# Patient Record
Sex: FEMALE | Race: WHITE | NOT HISPANIC OR LATINO | Employment: FULL TIME | URBAN - METROPOLITAN AREA
[De-identification: names, ages, dates, MRNs, and addresses within clinical notes are randomized per-mention and may not be internally consistent; named-entity substitution may affect disease eponyms.]

---

## 2017-04-07 ENCOUNTER — TELEPHONE (OUTPATIENT)
Dept: INTERNAL MEDICINE | Facility: CLINIC | Age: 52
End: 2017-04-07

## 2017-04-07 NOTE — TELEPHONE ENCOUNTER
----- Message from Afshan Barajas sent at 4/7/2017 12:09 PM CDT -----  Contact: Self  Pt would like a call back in regards to scheduling a np appt for a physical and to establish care.     Pt can be contacted at [phone]

## 2017-06-21 ENCOUNTER — PATIENT MESSAGE (OUTPATIENT)
Dept: FAMILY MEDICINE | Facility: CLINIC | Age: 52
End: 2017-06-21

## 2017-07-17 ENCOUNTER — OFFICE VISIT (OUTPATIENT)
Dept: PSYCHIATRY | Facility: CLINIC | Age: 52
End: 2017-07-17
Payer: COMMERCIAL

## 2017-07-17 VITALS
HEIGHT: 66 IN | DIASTOLIC BLOOD PRESSURE: 77 MMHG | WEIGHT: 201 LBS | HEART RATE: 66 BPM | BODY MASS INDEX: 32.3 KG/M2 | SYSTOLIC BLOOD PRESSURE: 135 MMHG

## 2017-07-17 DIAGNOSIS — F41.1 GENERALIZED ANXIETY DISORDER: ICD-10-CM

## 2017-07-17 PROCEDURE — 99999 PR PBB SHADOW E&M-EST. PATIENT-LVL II: CPT | Mod: PBBFAC,,, | Performed by: PSYCHIATRY & NEUROLOGY

## 2017-07-17 PROCEDURE — 99213 OFFICE O/P EST LOW 20 MIN: CPT | Mod: S$GLB,,, | Performed by: PSYCHIATRY & NEUROLOGY

## 2017-07-17 RX ORDER — ALPRAZOLAM 0.5 MG/1
0.5 TABLET ORAL DAILY PRN
Qty: 90 TABLET | Refills: 1 | Status: SHIPPED | OUTPATIENT
Start: 2017-07-17

## 2017-07-17 RX ORDER — DULOXETIN HYDROCHLORIDE 30 MG/1
30 CAPSULE, DELAYED RELEASE ORAL DAILY
Qty: 14 CAPSULE | Refills: 0 | Status: SHIPPED | OUTPATIENT
Start: 2017-07-17 | End: 2017-07-31

## 2017-07-17 RX ORDER — DULOXETIN HYDROCHLORIDE 60 MG/1
60 CAPSULE, DELAYED RELEASE ORAL DAILY
Qty: 30 CAPSULE | Refills: 1 | Status: ON HOLD | OUTPATIENT
Start: 2017-08-01 | End: 2019-01-11 | Stop reason: CLARIF

## 2017-07-17 NOTE — PROGRESS NOTES
"Outpatient Psychiatry Follow-Up Visit (MD/NP)    7/17/2017    Clinical Status of Patient:  Outpatient (Ambulatory)    Chief Complaint:  Lety Vogel is a 51 y.o. female who presents today for follow-up of anxiety.  Met with patient.      Interval History and Content of Current Session:  Interim Events/Subjective Report/Content of Current Session:   Chart reviewed by me.   reviewed, no evidence of Doctor shopping or early refills. Last filled prescription was for Xanax 0.5mg tabs #90 in 10/16.    Patient comes to clinic for anxiety and refill of xanax prescription. She takes 1-2 pills of xanax per week when she feels significant anxiety or panic attack symptoms. Often times, patient has a phyiscal discomfort, "feeling uneasy and tense... I feel like it might be due subconcious stress" in the afternoon, with no obvious trigger. She has been on xanax for 5 years. Her PCP tried her on a different medicine for anxiety years ago, but it was not effective, and she can't remember the name. Her sleep is fine, about 8 hours per night, and no anhedonia, hopelessness, worthlessness or depressed mood. No SI/HI or a/v hallucinations. She denies PTSD and OCD symptoms, and no psychotic symptoms. She has never attempted suicide before and never been in a psychiatric hospital.     Stressors include refinancing her house, and selling her mother's home. Patient's mother is now living with patient's sister. "Mom can be difficult" when describing her personality. Patient lives with her partner of many years, and they plan on getting  sometime this year. Patient doesn't smoke cigarettes, and is a social drinker. No illicit drug use. The patient also suffers from sciatica, and arthritis pain. She doesn't exercise.      Psychotherapy:  · Target symptoms: anxiety   · Why chosen therapy is appropriate versus another modality: relevant to diagnosis, patient responds to this modality, evidence based practice  · Outcome " monitoring methods: self-report  · Therapeutic intervention type: supportive psychotherapy  · Topics discussed/themes: work stress, parenting issues, life stage transitional issues  · The patient's response to the intervention is accepting. The patient's progress toward treatment goals is good.   · Duration of intervention: 15 minutes.    Review of Systems   · PSYCHIATRIC: Pertinant items are noted in the narrative.  · CONSTITUTIONAL: No weight gain or loss.   · MUSCULOSKELETAL: No pain or stiffness of the joints.  · NEUROLOGIC: No weakness, sensory changes, seizures, confusion, memory loss, tremor or other abnormal movements.  · ENDOCRINE: No polydipsia or polyuria.  · INTEGUMENTARY: No rashes or lacerations.  · EYES: No exophthalmos, jaundice or blindness.  · ENT: No dizziness, tinnitus or hearing loss.  · RESPIRATORY: No shortness of breath.  · CARDIOVASCULAR: No tachycardia or chest pain.  · GASTROINTESTINAL: No nausea, vomiting, pain, constipation or diarrhea.  · GENITOURINARY: No frequency, dysuria or sexual dysfunction.  · HEMATOLOGIC/LYMPHATIC: No excessive bleeding, prolonged or excessive bleeding after dental extraction/injury.  · ALLERGIC/IMMUNOLOGIC: No allergic response to materials, foods or animals at this time.    Past Medical, Family and Social History: The patient's past medical, family and social history have been reviewed and updated as appropriate within the electronic medical record - see encounter notes.    Compliance: yes    Side effects: None    Risk Parameters:  Patient reports no suicidal ideation  Patient reports no homicidal ideation  Patient reports no self-injurious behavior  Patient reports no violent behavior    Exam (detailed: at least 9 elements; comprehensive: all 15 elements)   Constitutional  Vitals:  Most recent vital signs, dated greater than 90 days prior to this appointment, were reviewed.   Vitals:    07/17/17 1022   BP: 135/77   Pulse: 66   Weight: 91.2 kg (201 lb)  "  Height: 5' 6" (1.676 m)        General:  unremarkable, age appropriate, obese     Musculoskeletal  Muscle Strength/Tone:  not examined   Gait & Station:  non-ataxic     Psychiatric  Speech:  no latency; no press   Mood & Affect:  happy  congruent and appropriate   Thought Process:  normal and logical   Associations:  intact   Thought Content:  normal, no suicidality, no homicidality, delusions, or paranoia   Insight:  intact   Judgement: behavior is adequate to circumstances   Orientation:  grossly intact   Memory: intact for content of interview   Language: grossly intact   Attention Span & Concentration:  able to focus   Fund of Knowledge:  intact and appropriate to age and level of education     Assessment and Diagnosis   Status/Progress: Based on the examination today, the patient's problem(s) is/are well controlled.  New problems have not been presented today.   Co-morbidities, Diagnostic uncertainty and Lack of compliance are not complicating management of the primary condition.  There are no active rule-out diagnoses for this patient at this time.     General Impression: Generalized Anxiety disorder        Intervention/Counseling/Treatment Plan   · Medication Management: Continue current medications. The risks and benefits of medication were discussed with the patient. continue xanax 0.5mg po prn   · Counseled on the long term effects of benzodiazapine use  · Start Cymbalta with plan to titrate up to 60mg daily in 2 weeks  · Encouraged to begin anxiety behavior therapy workbook      Return to Clinic: 6 weeks  "

## 2017-07-18 ENCOUNTER — OFFICE VISIT (OUTPATIENT)
Dept: INTERNAL MEDICINE | Facility: CLINIC | Age: 52
End: 2017-07-18
Payer: COMMERCIAL

## 2017-07-18 VITALS
SYSTOLIC BLOOD PRESSURE: 130 MMHG | TEMPERATURE: 98 F | HEIGHT: 66 IN | BODY MASS INDEX: 31.82 KG/M2 | HEART RATE: 79 BPM | OXYGEN SATURATION: 99 % | WEIGHT: 198 LBS | DIASTOLIC BLOOD PRESSURE: 70 MMHG

## 2017-07-18 DIAGNOSIS — E28.319 EARLY MENOPAUSE: ICD-10-CM

## 2017-07-18 DIAGNOSIS — Z91.89 SEDENTARY LIFESTYLE: ICD-10-CM

## 2017-07-18 DIAGNOSIS — Z00.00 ANNUAL PHYSICAL EXAM: Primary | ICD-10-CM

## 2017-07-18 DIAGNOSIS — Z83.3 FAMILY HISTORY OF DIABETES MELLITUS: ICD-10-CM

## 2017-07-18 PROCEDURE — 99999 PR PBB SHADOW E&M-EST. PATIENT-LVL IV: CPT | Mod: PBBFAC,,, | Performed by: INTERNAL MEDICINE

## 2017-07-18 PROCEDURE — 99386 PREV VISIT NEW AGE 40-64: CPT | Mod: S$GLB,,, | Performed by: INTERNAL MEDICINE

## 2017-07-18 RX ORDER — PHENTERMINE HYDROCHLORIDE 37.5 MG/1
37.5 CAPSULE ORAL EVERY MORNING
Status: ON HOLD | COMMUNITY
End: 2019-01-11 | Stop reason: CLARIF

## 2017-07-19 NOTE — PROGRESS NOTES
Subjective:       Patient ID: Lety Vogel is a 51 y.o. female.    Chief Complaint: Annual Exam  wellness  Entire chart reviewed, PMx, FHx, and Social History updated and reviewed.    HPI  Review of Systems   Constitutional: Negative for activity change.   Eyes: Negative for discharge.   Respiratory: Negative for wheezing.    Cardiovascular: Positive for palpitations. Negative for chest pain.   Gastrointestinal: Negative for constipation, diarrhea and vomiting.   Genitourinary: Negative for difficulty urinating and hematuria.   Musculoskeletal: Positive for arthralgias.   Neurological: Negative for headaches.   Psychiatric/Behavioral: Negative for dysphoric mood.       Objective:      Physical Exam   Constitutional: She is oriented to person, place, and time. She appears well-developed and well-nourished. No distress.   HENT:   Head: Normocephalic and atraumatic.   Right Ear: External ear normal.   Left Ear: External ear normal.   Nose: Nose normal.   Mouth/Throat: Oropharynx is clear and moist. No oropharyngeal exudate.   Eyes: Conjunctivae and EOM are normal. Pupils are equal, round, and reactive to light. Right eye exhibits no discharge. No scleral icterus.   Neck: Normal range of motion and full passive range of motion without pain. Neck supple. No spinous process tenderness and no muscular tenderness present. Carotid bruit is not present. No thyromegaly present.   Cardiovascular: Normal rate, regular rhythm, S1 normal, S2 normal, normal heart sounds and intact distal pulses.  Exam reveals no gallop and no friction rub.    No murmur heard.  Pulmonary/Chest: Effort normal and breath sounds normal. No respiratory distress. She has no wheezes. She has no rales. She exhibits no tenderness.   Abdominal: Soft. Bowel sounds are normal. She exhibits no distension and no mass. There is no tenderness. There is no rebound and no guarding.   Genitourinary: Pelvic exam was performed with patient supine. Uterus is not  deviated, not enlarged, not fixed and not tender. Cervix exhibits no motion tenderness, no discharge and no friability. Right adnexum displays no mass, no tenderness and no fullness. Left adnexum displays no mass, no tenderness and no fullness.   Musculoskeletal: Normal range of motion. She exhibits no edema or tenderness.   Lymphadenopathy:        Head (right side): No submental and no submandibular adenopathy present.        Head (left side): No submental and no submandibular adenopathy present.     She has no cervical adenopathy.        Right cervical: No superficial cervical, no deep cervical and no posterior cervical adenopathy present.       Left cervical: No superficial cervical, no deep cervical and no posterior cervical adenopathy present.        Right axillary: No pectoral and no lateral adenopathy present.        Left axillary: No pectoral and no lateral adenopathy present.       Right: No supraclavicular adenopathy present.        Left: No supraclavicular adenopathy present.   Neurological: She is alert and oriented to person, place, and time. She has normal reflexes. No cranial nerve deficit. She exhibits normal muscle tone. Coordination normal.   Skin: Skin is warm and dry. No rash noted.   Psychiatric: She has a normal mood and affect. Her behavior is normal. Her mood appears not anxious. Her speech is not rapid and/or pressured. She is not agitated. She does not exhibit a depressed mood.   Normal behavior, thought content, insight and judgement.       Assessment:       1. Annual physical exam    2. Early menopause    3. Family history of diabetes mellitus    4. Sedentary lifestyle        Plan:   Lety was seen today for annual exam.    Diagnoses and all orders for this visit:    Annual physical exam  The only thing that concerns me is her sedentary lifestyle  She has a brother who has obesity and diabetes.  He lives in [place] and is disabled.  Her father  at the age of 64.  Her mother is alive  and well at 84.  -     Lipid panel; Future  -     Hemoglobin A1c; Future    Early menopause  -     DXA Bone Density Spine And Hip; Future    Family history of diabetes mellitus  -     Hemoglobin A1c; Future    Sedentary lifestyle  Maybe she will take an interest in fencing again.  She used to enjoy this in the past.  She is a very john woman    Return in about 1 year (around 7/18/2018) for for PE.\

## 2017-07-25 ENCOUNTER — LAB VISIT (OUTPATIENT)
Dept: LAB | Facility: HOSPITAL | Age: 52
End: 2017-07-25
Attending: INTERNAL MEDICINE
Payer: COMMERCIAL

## 2017-07-25 DIAGNOSIS — Z00.00 ANNUAL PHYSICAL EXAM: ICD-10-CM

## 2017-07-25 DIAGNOSIS — Z83.3 FAMILY HISTORY OF DIABETES MELLITUS: ICD-10-CM

## 2017-07-25 LAB
CHOLEST/HDLC SERPL: 2.8 {RATIO}
HDL/CHOLESTEROL RATIO: 35.4 %
HDLC SERPL-MCNC: 181 MG/DL
HDLC SERPL-MCNC: 64 MG/DL
LDLC SERPL CALC-MCNC: 98.6 MG/DL
NONHDLC SERPL-MCNC: 117 MG/DL
TRIGL SERPL-MCNC: 92 MG/DL

## 2017-07-25 PROCEDURE — 83036 HEMOGLOBIN GLYCOSYLATED A1C: CPT

## 2017-07-25 PROCEDURE — 36415 COLL VENOUS BLD VENIPUNCTURE: CPT

## 2017-07-25 PROCEDURE — 80061 LIPID PANEL: CPT

## 2017-07-26 LAB
ESTIMATED AVG GLUCOSE: 108 MG/DL
HBA1C MFR BLD HPLC: 5.4 %

## 2017-07-28 ENCOUNTER — HOSPITAL ENCOUNTER (OUTPATIENT)
Dept: RADIOLOGY | Facility: CLINIC | Age: 52
Discharge: HOME | End: 2017-07-28
Attending: INTERNAL MEDICINE
Payer: COMMERCIAL

## 2017-07-28 DIAGNOSIS — E28.319 EARLY MENOPAUSE: ICD-10-CM

## 2017-07-28 PROCEDURE — 77080 DXA BONE DENSITY AXIAL: CPT | Mod: TC

## 2017-07-28 PROCEDURE — 77080 DXA BONE DENSITY AXIAL: CPT | Mod: 26,,, | Performed by: INTERNAL MEDICINE

## 2017-08-09 RX ORDER — ESTRADIOL 2 MG/1
2 TABLET ORAL DAILY
Qty: 30 TABLET | Refills: 11 | Status: SHIPPED | OUTPATIENT
Start: 2017-08-09 | End: 2018-09-10 | Stop reason: SDUPTHER

## 2017-09-07 ENCOUNTER — PATIENT MESSAGE (OUTPATIENT)
Dept: INTERNAL MEDICINE | Facility: CLINIC | Age: 52
End: 2017-09-07

## 2017-09-07 DIAGNOSIS — L81.9 PIGMENTED SKIN LESION SUSPICIOUS FOR MALIGNANT NEOPLASM: Primary | ICD-10-CM

## 2017-12-07 ENCOUNTER — PATIENT MESSAGE (OUTPATIENT)
Dept: OBSTETRICS AND GYNECOLOGY | Facility: CLINIC | Age: 52
End: 2017-12-07

## 2017-12-07 DIAGNOSIS — Z12.31 VISIT FOR SCREENING MAMMOGRAM: Primary | ICD-10-CM

## 2017-12-20 ENCOUNTER — HOSPITAL ENCOUNTER (OUTPATIENT)
Dept: RADIOLOGY | Facility: HOSPITAL | Age: 52
Discharge: HOME | End: 2017-12-20
Attending: OBSTETRICS & GYNECOLOGY
Payer: COMMERCIAL

## 2017-12-20 VITALS — WEIGHT: 198 LBS | BODY MASS INDEX: 31.82 KG/M2 | HEIGHT: 66 IN

## 2017-12-20 DIAGNOSIS — Z12.31 VISIT FOR SCREENING MAMMOGRAM: ICD-10-CM

## 2017-12-20 PROCEDURE — 77067 SCR MAMMO BI INCL CAD: CPT | Mod: TC

## 2017-12-20 PROCEDURE — 77063 BREAST TOMOSYNTHESIS BI: CPT | Mod: 26,,, | Performed by: RADIOLOGY

## 2017-12-20 PROCEDURE — 77067 SCR MAMMO BI INCL CAD: CPT | Mod: 26,,, | Performed by: RADIOLOGY

## 2017-12-28 ENCOUNTER — HOSPITAL ENCOUNTER (OUTPATIENT)
Dept: RADIOLOGY | Facility: HOSPITAL | Age: 52
Discharge: HOME | End: 2017-12-28
Attending: OBSTETRICS & GYNECOLOGY
Payer: COMMERCIAL

## 2017-12-28 DIAGNOSIS — R92.8 ABNORMAL MAMMOGRAM: ICD-10-CM

## 2017-12-28 PROCEDURE — 77061 BREAST TOMOSYNTHESIS UNI: CPT | Mod: TC,PO

## 2017-12-28 PROCEDURE — 77065 DX MAMMO INCL CAD UNI: CPT | Mod: 26,,, | Performed by: RADIOLOGY

## 2017-12-28 PROCEDURE — 76642 ULTRASOUND BREAST LIMITED: CPT | Mod: 26,RT,, | Performed by: RADIOLOGY

## 2017-12-28 PROCEDURE — 77061 BREAST TOMOSYNTHESIS UNI: CPT | Mod: 26,,, | Performed by: RADIOLOGY

## 2018-01-02 ENCOUNTER — PATIENT MESSAGE (OUTPATIENT)
Dept: OBSTETRICS AND GYNECOLOGY | Facility: CLINIC | Age: 53
End: 2018-01-02

## 2018-09-07 RX ORDER — ESTRADIOL 2 MG/1
TABLET ORAL
Qty: 30 TABLET | Refills: 0 | Status: CANCELLED | OUTPATIENT
Start: 2018-09-07

## 2018-09-10 ENCOUNTER — PATIENT MESSAGE (OUTPATIENT)
Dept: OBSTETRICS AND GYNECOLOGY | Facility: CLINIC | Age: 53
End: 2018-09-10

## 2018-09-10 RX ORDER — ESTRADIOL 2 MG/1
2 TABLET ORAL DAILY
Qty: 30 TABLET | Refills: 11 | Status: SHIPPED | OUTPATIENT
Start: 2018-09-10 | End: 2020-03-18

## 2018-10-31 ENCOUNTER — PATIENT MESSAGE (OUTPATIENT)
Dept: INTERNAL MEDICINE | Facility: CLINIC | Age: 53
End: 2018-10-31

## 2018-10-31 ENCOUNTER — TELEPHONE (OUTPATIENT)
Dept: INTERNAL MEDICINE | Facility: CLINIC | Age: 53
End: 2018-10-31

## 2018-10-31 NOTE — TELEPHONE ENCOUNTER
Hello. I'm due for my colonoscopy. Do I need a referral or an order placed before I can schedule? I'd like to get the same physician and all as last time. I looked through my records and it was a Dr. Hall.   Thanks,   Lety           I pend the Colonoscopy orders  Please advise  Thank you

## 2018-11-01 NOTE — TELEPHONE ENCOUNTER
Dear Analy,  Please schedule an appointment for this patient.  I'm not comfortable ordering a colonoscopy because it is an invasive screening procedure with potential risks simply  because I have NOT seen her since July of 2017. I remember her as a john, intelligent  woman ( I have only seen her ONCE)  and I hope she is well and just needs a screening colonoscopy   it is great  that she sees the value of screening colonoscopy and wants this. Yet I don't want to order a procedure unless I know the patient understands the risks and benefits,  I hope you see my pont of view.  Sincerely, Dr. Maya Alvarado

## 2018-11-28 ENCOUNTER — TELEPHONE (OUTPATIENT)
Dept: INTERNAL MEDICINE | Facility: CLINIC | Age: 53
End: 2018-11-28

## 2018-11-28 NOTE — TELEPHONE ENCOUNTER
----- Message from Giovanni Bryant sent at 11/28/2018  3:54 PM CST -----  Contact: Patient  Patient wants to make an appointment for Friday for a chickenpox vaccine    Callback:[phone]

## 2018-11-28 NOTE — TELEPHONE ENCOUNTER
Spoke with patient and informed her that she can go to the pharmacy for any of her vaccines she may need

## 2019-01-04 ENCOUNTER — HOSPITAL ENCOUNTER (EMERGENCY)
Facility: HOSPITAL | Age: 54
Discharge: HOME | End: 2019-01-04
Attending: EMERGENCY MEDICINE
Payer: COMMERCIAL

## 2019-01-04 ENCOUNTER — TELEPHONE (OUTPATIENT)
Dept: OBSTETRICS AND GYNECOLOGY | Facility: CLINIC | Age: 54
End: 2019-01-04

## 2019-01-04 VITALS
DIASTOLIC BLOOD PRESSURE: 84 MMHG | HEIGHT: 66 IN | OXYGEN SATURATION: 100 % | RESPIRATION RATE: 20 BRPM | WEIGHT: 200 LBS | SYSTOLIC BLOOD PRESSURE: 162 MMHG | TEMPERATURE: 98 F | BODY MASS INDEX: 32.14 KG/M2 | HEART RATE: 70 BPM

## 2019-01-04 DIAGNOSIS — M25.532 WRIST PAIN, ACUTE, LEFT: ICD-10-CM

## 2019-01-04 DIAGNOSIS — S52.602A CLOSED FRACTURE OF DISTAL ENDS OF LEFT RADIUS AND ULNA, INITIAL ENCOUNTER: Primary | ICD-10-CM
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
## 2019-01-04 DIAGNOSIS — S52.502A CLOSED FRACTURE OF DISTAL ENDS OF LEFT RADIUS AND ULNA, INITIAL ENCOUNTER: Primary | ICD-10-CM

## 2019-01-04 PROCEDURE — 99284 PR EMERGENCY DEPT VISIT,LEVEL IV: ICD-10-PCS | Mod: ,,, | Performed by: EMERGENCY MEDICINE

## 2019-01-04 PROCEDURE — 63600175 PHARM REV CODE 636 W HCPCS: Performed by: EMERGENCY MEDICINE

## 2019-01-04 PROCEDURE — 99285 EMERGENCY DEPT VISIT HI MDM: CPT | Mod: 25

## 2019-01-04 PROCEDURE — 99284 EMERGENCY DEPT VISIT MOD MDM: CPT | Mod: ,,, | Performed by: EMERGENCY MEDICINE

## 2019-01-04 PROCEDURE — 25000003 PHARM REV CODE 250: Performed by: PHYSICIAN ASSISTANT

## 2019-01-04 PROCEDURE — 25000003 PHARM REV CODE 250: Performed by: STUDENT IN AN ORGANIZED HEALTH CARE EDUCATION/TRAINING PROGRAM

## 2019-01-04 PROCEDURE — 25605 CLTX DST RDL FX/EPHYS SEP W/: CPT | Mod: LT

## 2019-01-04 PROCEDURE — 96372 THER/PROPH/DIAG INJ SC/IM: CPT

## 2019-01-04 RX ORDER — HYDROCODONE BITARTRATE AND ACETAMINOPHEN 5; 325 MG/1; MG/1
1 TABLET ORAL EVERY 4 HOURS PRN
Qty: 18 TABLET | Refills: 0 | Status: ON HOLD | OUTPATIENT
Start: 2019-01-04 | End: 2019-01-11 | Stop reason: HOSPADM

## 2019-01-04 RX ORDER — ONDANSETRON 4 MG/1
4 TABLET, FILM COATED ORAL EVERY 6 HOURS
Qty: 15 TABLET | Refills: 0 | Status: SHIPPED | OUTPATIENT
Start: 2019-01-04 | End: 2021-07-29 | Stop reason: ALTCHOICE

## 2019-01-04 RX ORDER — MORPHINE SULFATE 2 MG/ML
6 INJECTION, SOLUTION INTRAMUSCULAR; INTRAVENOUS
Status: COMPLETED | OUTPATIENT
Start: 2019-01-04 | End: 2019-01-04

## 2019-01-04 RX ORDER — HYDROCODONE BITARTRATE AND ACETAMINOPHEN 10; 325 MG/1; MG/1
1 TABLET ORAL
Status: COMPLETED | OUTPATIENT
Start: 2019-01-04 | End: 2019-01-04

## 2019-01-04 RX ORDER — LIDOCAINE HYDROCHLORIDE 10 MG/ML
10 INJECTION, SOLUTION EPIDURAL; INFILTRATION; INTRACAUDAL; PERINEURAL ONCE
Status: COMPLETED | OUTPATIENT
Start: 2019-01-04 | End: 2019-01-04

## 2019-01-04 RX ORDER — DIAZEPAM 5 MG/1
5 TABLET ORAL ONCE
Status: COMPLETED | OUTPATIENT
Start: 2019-01-04 | End: 2019-01-04

## 2019-01-04 RX ORDER — ONDANSETRON 4 MG/1
4 TABLET, ORALLY DISINTEGRATING ORAL
Status: COMPLETED | OUTPATIENT
Start: 2019-01-04 | End: 2019-01-04

## 2019-01-04 RX ADMIN — Medication 6 MG: at 05:01

## 2019-01-04 RX ADMIN — LIDOCAINE HYDROCHLORIDE 100 MG: 10 INJECTION, SOLUTION EPIDURAL; INFILTRATION; INTRACAUDAL at 07:01

## 2019-01-04 RX ADMIN — HYDROCODONE BITARTRATE AND ACETAMINOPHEN 1 TABLET: 10; 325 TABLET ORAL at 09:01

## 2019-01-04 RX ADMIN — DIAZEPAM 5 MG: 5 TABLET ORAL at 06:01

## 2019-01-04 RX ADMIN — ONDANSETRON 4 MG: 4 TABLET, ORALLY DISINTEGRATING ORAL at 06:01

## 2019-01-04 NOTE — ED TRIAGE NOTES
"Patient reports "stepping" back off ladder and falling into table. Patient reports she struck her left wrist on the leg of the table. Patient presents with left wrist pain and minor deformity noted. +PMS noted to left hand and wrist. +2 radial pulse present. Patient A&Ox4 and following commands.   "
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
## 2019-01-04 NOTE — TELEPHONE ENCOUNTER
----- Message from Merary Domingo sent at 1/4/2019  9:01 AM CST -----  Contact: Pt   Name of Who is Calling: PHILIPP [facility]IVE [[phone]]    What is the request in detail: Pt is requesting an appt for her annual and she prefer a Friday if possible. Please advise.     Can the clinic reply by [facility]: No     What Number to Call Back if not in [facility]: [phone]

## 2019-01-05 NOTE — ASSESSMENT & PLAN NOTE
Lety Vogel is a 53 y.o. female with a left DRFx  - Reduced and splinted in ED under hematoma block  - NWB to [place], pt encouraged to keep extremity iced and elevated at all times  - pain control per ED   -Recommend 500mg Vitamin C daily x 50 days for CRPS prophylaxis  - discussed with pt recc for operative fixation of this fracture   - F/u in hand clinic next week to discuss surgery

## 2019-01-05 NOTE — DISCHARGE INSTRUCTIONS
Please follow-up with our Orthopedic clinic as directed for evaluation of your fracture.  Please take Tylenol for ongoing pain management. You may use the prescribed Norco for breakthrough pain as needed.   Please return to the ED for new, worsening, or concerning symptoms.    Our goal in the emergency department is to always give you outstanding care and exceptional service. You may receive a survey by mail or e-mail in the next week regarding your experience in our ED. We would greatly appreciate your completing and returning the survey. Your feedback provides us with a way to recognize our staff who give very good care and it helps us learn how to improve when your experience was below our aspiration of excellence.
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
## 2019-01-05 NOTE — ED NOTES
Report received from Rudi Antonio RN. Care assumed. Pt resting comfortably and independently repositioned in stretcher with bed locked in lowest position for safety. NAD and patient states she feels a little better. Respirations even and unlabored and visible chest rise noted. Patient offered bathroom assistance and denies need at this time. Pt instructed to call if assistance is needed.. No needs at this time. Will continue to monitor. Call light within reach. Family at bedside. Orthopedic surgery at bedside

## 2019-01-05 NOTE — HPI
Lety Vogel is a RHD 53 y.o. female who presents to the ED with c/o left wrist pain after a fall this afternoon. She missed the last step as she was coming down a small ladder and fell back hitting her left wrist on a table. Severe wrist pain and deformity. Denies numbness and tingling to LUE.  Denies any other musculoskeletal pain or injuries. No history of fractures. She is not on anticoagulation.

## 2019-01-07 ENCOUNTER — TELEPHONE (OUTPATIENT)
Dept: ORTHOPEDICS | Facility: CLINIC | Age: 54
End: 2019-01-07

## 2019-01-07 NOTE — TELEPHONE ENCOUNTER
----- Message from Flora Sandoval MD sent at 1/4/2019  8:13 PM CST -----  Please schedule for fclinic early next week, patient will need surgery hiopefully next week for left DRFx thanks

## 2019-01-08 ENCOUNTER — DOCUMENTATION ONLY (OUTPATIENT)
Dept: ORTHOPEDICS | Facility: CLINIC | Age: 54
End: 2019-01-08

## 2019-01-08 ENCOUNTER — OFFICE VISIT (OUTPATIENT)
Dept: ORTHOPEDICS | Facility: CLINIC | Age: 54
End: 2019-01-08
Payer: COMMERCIAL

## 2019-01-08 VITALS
HEART RATE: 93 BPM | SYSTOLIC BLOOD PRESSURE: 124 MMHG | WEIGHT: 200 LBS | HEIGHT: 66 IN | BODY MASS INDEX: 32.14 KG/M2 | DIASTOLIC BLOOD PRESSURE: 84 MMHG

## 2019-01-08 DIAGNOSIS — S52.602A CLOSED FRACTURE OF DISTAL END OF LEFT ULNA, UNSPECIFIED FRACTURE MORPHOLOGY, INITIAL ENCOUNTER: ICD-10-CM

## 2019-01-08 DIAGNOSIS — S52.592A OTHER CLOSED FRACTURE OF DISTAL END OF LEFT RADIUS, INITIAL ENCOUNTER: Primary | ICD-10-CM

## 2019-01-08 DIAGNOSIS — S52.502A CLOSED FRACTURE OF DISTAL END OF LEFT RADIUS, UNSPECIFIED FRACTURE MORPHOLOGY, INITIAL ENCOUNTER: Primary | ICD-10-CM

## 2019-01-08 PROCEDURE — 99204 OFFICE O/P NEW MOD 45 MIN: CPT | Mod: 25,S$GLB,, | Performed by: PHYSICIAN ASSISTANT

## 2019-01-08 PROCEDURE — 3008F PR BODY MASS INDEX (BMI) DOCUMENTED: ICD-10-PCS | Mod: CPTII,S$GLB,, | Performed by: PHYSICIAN ASSISTANT

## 2019-01-08 PROCEDURE — 3008F BODY MASS INDEX DOCD: CPT | Mod: CPTII,S$GLB,, | Performed by: PHYSICIAN ASSISTANT

## 2019-01-08 PROCEDURE — 29105 PR APPLY LONG ARM SPLINT: ICD-10-PCS | Mod: LT,S$GLB,, | Performed by: PHYSICIAN ASSISTANT

## 2019-01-08 PROCEDURE — 99204 PR OFFICE/OUTPT VISIT, NEW, LEVL IV, 45-59 MIN: ICD-10-PCS | Mod: 25,S$GLB,, | Performed by: PHYSICIAN ASSISTANT

## 2019-01-08 PROCEDURE — 99999 PR PBB SHADOW E&M-EST. PATIENT-LVL III: CPT | Mod: PBBFAC,,, | Performed by: PHYSICIAN ASSISTANT

## 2019-01-08 PROCEDURE — 29105 APPLICATION LONG ARM SPLINT: CPT | Mod: LT,S$GLB,, | Performed by: PHYSICIAN ASSISTANT

## 2019-01-08 PROCEDURE — 99999 PR PBB SHADOW E&M-EST. PATIENT-LVL III: ICD-10-PCS | Mod: PBBFAC,,, | Performed by: PHYSICIAN ASSISTANT

## 2019-01-08 RX ORDER — MUPIROCIN 20 MG/G
OINTMENT TOPICAL
Status: CANCELLED | OUTPATIENT
Start: 2019-01-08
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
## 2019-01-10 ENCOUNTER — TELEPHONE (OUTPATIENT)
Dept: ORTHOPEDICS | Facility: CLINIC | Age: 54
End: 2019-01-10

## 2019-01-10 NOTE — TELEPHONE ENCOUNTER
Lety Vogel notified of arrival time 0945 for surgery on 1/11/19 with Dr. EBONY Dai. At [facility]. Post Op appointment made, slip in mail. Reminded of need for a ride home from surgery.
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
## 2019-01-11 ENCOUNTER — HOSPITAL ENCOUNTER (OUTPATIENT)
Facility: HOSPITAL | Age: 54
Discharge: HOME | End: 2019-01-11
Attending: ORTHOPAEDIC SURGERY | Admitting: ORTHOPAEDIC SURGERY
Payer: COMMERCIAL

## 2019-01-11 ENCOUNTER — ANESTHESIA (OUTPATIENT)
Dept: SURGERY | Facility: HOSPITAL | Age: 54
End: 2019-01-11
Payer: COMMERCIAL

## 2019-01-11 ENCOUNTER — ANESTHESIA EVENT (OUTPATIENT)
Dept: SURGERY | Facility: HOSPITAL | Age: 54
End: 2019-01-11
Payer: COMMERCIAL

## 2019-01-11 VITALS
SYSTOLIC BLOOD PRESSURE: 127 MMHG | HEIGHT: 66 IN | TEMPERATURE: 97 F | WEIGHT: 200 LBS | RESPIRATION RATE: 20 BRPM | DIASTOLIC BLOOD PRESSURE: 86 MMHG | OXYGEN SATURATION: 100 % | BODY MASS INDEX: 32.14 KG/M2 | HEART RATE: 92 BPM

## 2019-01-11 DIAGNOSIS — S52.592A OTHER CLOSED FRACTURE OF DISTAL END OF LEFT RADIUS, INITIAL ENCOUNTER: ICD-10-CM

## 2019-01-11 DIAGNOSIS — S52.502D CLOSED FRACTURE OF DISTAL END OF LEFT RADIUS WITH ROUTINE HEALING, UNSPECIFIED FRACTURE MORPHOLOGY, SUBSEQUENT ENCOUNTER: Primary | ICD-10-CM

## 2019-01-11 PROCEDURE — 76942 LEFT SUPRACLAVICULAR CATHETER: ICD-10-PCS | Mod: 26,,, | Performed by: ANESTHESIOLOGY

## 2019-01-11 PROCEDURE — 64416 NJX AA&/STRD BRCH PL NFS IMG: CPT | Performed by: ANESTHESIOLOGY

## 2019-01-11 PROCEDURE — 25000003 PHARM REV CODE 250: Performed by: ORTHOPAEDIC SURGERY

## 2019-01-11 PROCEDURE — 63600175 PHARM REV CODE 636 W HCPCS: Performed by: PHYSICIAN ASSISTANT

## 2019-01-11 PROCEDURE — 63600175 PHARM REV CODE 636 W HCPCS: Performed by: NURSE ANESTHETIST, CERTIFIED REGISTERED

## 2019-01-11 PROCEDURE — 71000015 HC POSTOP RECOV 1ST HR: Performed by: ORTHOPAEDIC SURGERY

## 2019-01-11 PROCEDURE — D9220A PRA ANESTHESIA: Mod: CRNA,,, | Performed by: NURSE ANESTHETIST, CERTIFIED REGISTERED

## 2019-01-11 PROCEDURE — C1713 ANCHOR/SCREW BN/BN,TIS/BN: HCPCS | Performed by: ORTHOPAEDIC SURGERY

## 2019-01-11 PROCEDURE — 36000711: Performed by: ORTHOPAEDIC SURGERY

## 2019-01-11 PROCEDURE — 37000008 HC ANESTHESIA 1ST 15 MINUTES: Performed by: ORTHOPAEDIC SURGERY

## 2019-01-11 PROCEDURE — 25609 OPTX DST RD XART FX/EP SEP3+: CPT | Mod: LT,,, | Performed by: ORTHOPAEDIC SURGERY

## 2019-01-11 PROCEDURE — 63600175 PHARM REV CODE 636 W HCPCS: Performed by: ANESTHESIOLOGY

## 2019-01-11 PROCEDURE — 25000003 PHARM REV CODE 250: Performed by: PHYSICIAN ASSISTANT

## 2019-01-11 PROCEDURE — 25609 PR OPEN RX DISTAL RADIUS FX, INTRA-ARTICULAR, 3+ FRAG: ICD-10-PCS | Mod: LT,,, | Performed by: ORTHOPAEDIC SURGERY

## 2019-01-11 PROCEDURE — 37000009 HC ANESTHESIA EA ADD 15 MINS: Performed by: ORTHOPAEDIC SURGERY

## 2019-01-11 PROCEDURE — D9220A PRA ANESTHESIA: ICD-10-PCS | Mod: ANES,,, | Performed by: ANESTHESIOLOGY

## 2019-01-11 PROCEDURE — 36000710: Performed by: ORTHOPAEDIC SURGERY

## 2019-01-11 PROCEDURE — C1769 GUIDE WIRE: HCPCS | Performed by: ORTHOPAEDIC SURGERY

## 2019-01-11 PROCEDURE — 76942 ECHO GUIDE FOR BIOPSY: CPT | Mod: 26,,, | Performed by: ANESTHESIOLOGY

## 2019-01-11 PROCEDURE — 25000003 PHARM REV CODE 250: Performed by: NURSE ANESTHETIST, CERTIFIED REGISTERED

## 2019-01-11 PROCEDURE — 64416 LEFT SUPRACLAVICULAR CATHETER: ICD-10-PCS | Mod: 59,LT,, | Performed by: ANESTHESIOLOGY

## 2019-01-11 PROCEDURE — D9220A PRA ANESTHESIA: ICD-10-PCS | Mod: CRNA,,, | Performed by: NURSE ANESTHETIST, CERTIFIED REGISTERED

## 2019-01-11 PROCEDURE — 27201423 OPTIME MED/SURG SUP & DEVICES STERILE SUPPLY: Performed by: ORTHOPAEDIC SURGERY

## 2019-01-11 PROCEDURE — D9220A PRA ANESTHESIA: Mod: ANES,,, | Performed by: ANESTHESIOLOGY

## 2019-01-11 PROCEDURE — 63600175 PHARM REV CODE 636 W HCPCS: Performed by: STUDENT IN AN ORGANIZED HEALTH CARE EDUCATION/TRAINING PROGRAM

## 2019-01-11 DEVICE — PLATE ACU-LOC 2 VDF LT NARROW: Type: IMPLANTABLE DEVICE | Site: RADIUS | Status: FUNCTIONAL

## 2019-01-11 DEVICE — SCREW BONE LOK CONG 2.3X20MM: Type: IMPLANTABLE DEVICE | Site: RADIUS | Status: FUNCTIONAL

## 2019-01-11 DEVICE — SCREW BNE N LOK HEXLB 3.5X12: Type: IMPLANTABLE DEVICE | Site: RADIUS | Status: FUNCTIONAL

## 2019-01-11 DEVICE — SCREW BONE LOK CONG 2.3X22MM: Type: IMPLANTABLE DEVICE | Site: RADIUS | Status: FUNCTIONAL

## 2019-01-11 DEVICE — SCREW BONE 2.3 X 18MM: Type: IMPLANTABLE DEVICE | Site: RADIUS | Status: FUNCTIONAL

## 2019-01-11 DEVICE — SCREW BONE NONLOCK HEX 3.5X10: Type: IMPLANTABLE DEVICE | Site: RADIUS | Status: FUNCTIONAL

## 2019-01-11 RX ORDER — BACITRACIN 500 [USP'U]/G
OINTMENT TOPICAL
Status: DISCONTINUED
Start: 2019-01-11 | End: 2019-01-11 | Stop reason: HOSPADM

## 2019-01-11 RX ORDER — FENTANYL CITRATE 50 UG/ML
25 INJECTION, SOLUTION INTRAMUSCULAR; INTRAVENOUS EVERY 5 MIN PRN
Status: DISCONTINUED | OUTPATIENT
Start: 2019-01-11 | End: 2019-01-11 | Stop reason: HOSPADM

## 2019-01-11 RX ORDER — BACITRACIN ZINC 500 UNIT/G
OINTMENT (GRAM) TOPICAL
Status: DISCONTINUED | OUTPATIENT
Start: 2019-01-11 | End: 2019-01-11 | Stop reason: HOSPADM

## 2019-01-11 RX ORDER — ROPIVACAINE HYDROCHLORIDE 5 MG/ML
INJECTION, SOLUTION EPIDURAL; INFILTRATION; PERINEURAL
Status: COMPLETED | OUTPATIENT
Start: 2019-01-11 | End: 2019-01-11

## 2019-01-11 RX ORDER — SODIUM CHLORIDE 9 MG/ML
INJECTION, SOLUTION INTRAVENOUS CONTINUOUS PRN
Status: DISCONTINUED | OUTPATIENT
Start: 2019-01-11 | End: 2019-01-11

## 2019-01-11 RX ORDER — LIDOCAINE HCL/PF 100 MG/5ML
SYRINGE (ML) INTRAVENOUS
Status: DISCONTINUED | OUTPATIENT
Start: 2019-01-11 | End: 2019-01-11

## 2019-01-11 RX ORDER — FENTANYL CITRATE 50 UG/ML
25 INJECTION, SOLUTION INTRAMUSCULAR; INTRAVENOUS EVERY 5 MIN PRN
Status: DISCONTINUED | OUTPATIENT
Start: 2019-01-11 | End: 2019-01-11 | Stop reason: SDUPTHER

## 2019-01-11 RX ORDER — OXYCODONE HYDROCHLORIDE 5 MG/1
10 TABLET ORAL EVERY 4 HOURS PRN
Status: DISCONTINUED | OUTPATIENT
Start: 2019-01-11 | End: 2019-01-11 | Stop reason: HOSPADM

## 2019-01-11 RX ORDER — MIDAZOLAM HYDROCHLORIDE 1 MG/ML
0.5 INJECTION INTRAMUSCULAR; INTRAVENOUS
Status: DISCONTINUED | OUTPATIENT
Start: 2019-01-11 | End: 2019-01-11 | Stop reason: SDUPTHER

## 2019-01-11 RX ORDER — ASCORBIC ACID 500 MG
500 TABLET ORAL DAILY
Qty: 50 TABLET | Refills: 0 | Status: SHIPPED | OUTPATIENT
Start: 2019-01-11

## 2019-01-11 RX ORDER — SODIUM CHLORIDE 0.9 % (FLUSH) 0.9 %
3 SYRINGE (ML) INJECTION
Status: DISCONTINUED | OUTPATIENT
Start: 2019-01-11 | End: 2019-01-11 | Stop reason: HOSPADM

## 2019-01-11 RX ORDER — PROPOFOL 10 MG/ML
INJECTION, EMULSION INTRAVENOUS CONTINUOUS PRN
Status: DISCONTINUED | OUTPATIENT
Start: 2019-01-11 | End: 2019-01-11

## 2019-01-11 RX ORDER — DEXAMETHASONE SODIUM PHOSPHATE 4 MG/ML
INJECTION, SOLUTION INTRA-ARTICULAR; INTRALESIONAL; INTRAMUSCULAR; INTRAVENOUS; SOFT TISSUE
Status: DISCONTINUED | OUTPATIENT
Start: 2019-01-11 | End: 2019-01-11

## 2019-01-11 RX ORDER — MUPIROCIN 20 MG/G
OINTMENT TOPICAL
Status: DISCONTINUED | OUTPATIENT
Start: 2019-01-11 | End: 2019-01-11 | Stop reason: HOSPADM

## 2019-01-11 RX ORDER — OXYCODONE AND ACETAMINOPHEN 5; 325 MG/1; MG/1
1 TABLET ORAL EVERY 6 HOURS PRN
Qty: 35 TABLET | Refills: 0 | Status: SHIPPED | OUTPATIENT
Start: 2019-01-11 | End: 2021-07-29 | Stop reason: ALTCHOICE

## 2019-01-11 RX ORDER — PROPOFOL 10 MG/ML
INJECTION, EMULSION INTRAVENOUS
Status: DISCONTINUED | OUTPATIENT
Start: 2019-01-11 | End: 2019-01-11

## 2019-01-11 RX ORDER — CEFAZOLIN SODIUM 1 G/3ML
2 INJECTION, POWDER, FOR SOLUTION INTRAMUSCULAR; INTRAVENOUS
Status: COMPLETED | OUTPATIENT
Start: 2019-01-11 | End: 2019-01-11

## 2019-01-11 RX ORDER — OXYCODONE HYDROCHLORIDE 5 MG/1
5 TABLET ORAL EVERY 4 HOURS PRN
Status: DISCONTINUED | OUTPATIENT
Start: 2019-01-11 | End: 2019-01-11 | Stop reason: HOSPADM

## 2019-01-11 RX ORDER — MIDAZOLAM HYDROCHLORIDE 1 MG/ML
0.5 INJECTION INTRAMUSCULAR; INTRAVENOUS
Status: DISCONTINUED | OUTPATIENT
Start: 2019-01-11 | End: 2019-01-11 | Stop reason: HOSPADM

## 2019-01-11 RX ORDER — ONDANSETRON 2 MG/ML
4 INJECTION INTRAMUSCULAR; INTRAVENOUS ONCE AS NEEDED
Status: DISCONTINUED | OUTPATIENT
Start: 2019-01-11 | End: 2019-01-11 | Stop reason: HOSPADM

## 2019-01-11 RX ADMIN — SODIUM CHLORIDE, SODIUM GLUCONATE, SODIUM ACETATE, POTASSIUM CHLORIDE, MAGNESIUM CHLORIDE, SODIUM PHOSPHATE, DIBASIC, AND POTASSIUM PHOSPHATE: .53; .5; .37; .037; .03; .012; .00082 INJECTION, SOLUTION INTRAVENOUS at 03:01

## 2019-01-11 RX ADMIN — ROPIVACAINE HYDROCHLORIDE 30 ML: 5 INJECTION, SOLUTION EPIDURAL; INFILTRATION; PERINEURAL at 02:01

## 2019-01-11 RX ADMIN — MUPIROCIN: 20 OINTMENT TOPICAL at 02:01

## 2019-01-11 RX ADMIN — FENTANYL CITRATE 50 MCG: 50 INJECTION INTRAMUSCULAR; INTRAVENOUS at 02:01

## 2019-01-11 RX ADMIN — ROPIVACAINE HYDROCHLORIDE: 2 INJECTION, SOLUTION EPIDURAL; INFILTRATION at 05:01

## 2019-01-11 RX ADMIN — PROPOFOL 200 MCG/KG/MIN: 10 INJECTION, EMULSION INTRAVENOUS at 03:01

## 2019-01-11 RX ADMIN — SODIUM CHLORIDE: 0.9 INJECTION, SOLUTION INTRAVENOUS at 03:01

## 2019-01-11 RX ADMIN — CEFAZOLIN 2 G: 330 INJECTION, POWDER, FOR SOLUTION INTRAMUSCULAR; INTRAVENOUS at 03:01

## 2019-01-11 RX ADMIN — MIDAZOLAM HYDROCHLORIDE 2 MG: 1 INJECTION, SOLUTION INTRAMUSCULAR; INTRAVENOUS at 02:01

## 2019-01-11 RX ADMIN — LIDOCAINE HYDROCHLORIDE 50 MG: 20 INJECTION, SOLUTION INTRAVENOUS at 03:01

## 2019-01-11 RX ADMIN — PROPOFOL 50 MG: 10 INJECTION, EMULSION INTRAVENOUS at 03:01

## 2019-01-11 RX ADMIN — DEXAMETHASONE SODIUM PHOSPHATE 8 MG: 4 INJECTION, SOLUTION INTRAMUSCULAR; INTRAVENOUS at 03:01

## 2019-01-11 NOTE — DISCHARGE INSTRUCTIONS

## 2019-01-11 NOTE — PLAN OF CARE
Patient and spouse state they are ready to be discharged. Instructions and narcotic prescription given to patient and family. Both verbalize understanding. Patient tolerating po liquids with no difficulty. Patient denies pain. Anesthesia consent and surgical consent in chart upon patient's discharge from [facility].

## 2019-01-11 NOTE — TRANSFER OF CARE
"Anesthesia Transfer of Care Note    Patient: Lety Vogel    Procedure(s) Performed: Procedure(s) (LRB):  ORIF, FRACTURE, RADIUS, DISTAL LEFT (Left)    Patient location: PACU    Anesthesia Type: general    Transport from OR: Transported from OR on room air with adequate spontaneous ventilation    Post pain: adequate analgesia    Post assessment: no apparent anesthetic complications and tolerated procedure well    Post vital signs: stable    Level of consciousness: awake, alert and oriented    Complications: none    Transfer of care protocol was followed      Last vitals:   Visit Vitals  /65 (BP Location: Right arm, Patient Position: Lying)   Pulse 90   Temp 36.6 °C (97.9 °F) (Oral)   Resp 19   Ht 5' 6" (1.676 m)   Wt 90.7 kg (200 lb)   LMP 06/11/2018 (Approximate)   SpO2 100%   Breastfeeding? No   BMI 32.28 kg/m²     "

## 2019-01-11 NOTE — ANESTHESIA PREPROCEDURE EVALUATION
01/11/2019  Lety Vogel is a 53 y.o., female.    Anesthesia Evaluation    I have reviewed the Patient Summary Reports.     I have reviewed the Medications.     Review of Systems  Anesthesia Hx:  No problems with previous Anesthesia Denies Hx of Anesthetic complications  History of prior surgery of interest to airway management or planning:  Denies Personal Hx of Anesthesia complications.   Social:  Former Smoker    Hematology/Oncology:  Hematology Normal   Oncology Normal     EENT/Dental:EENT/Dental Normal   Cardiovascular:   Exercise tolerance: good  Denies Angina.    Pulmonary:   Denies Shortness of breath.    Renal/:  Renal/ Normal     Hepatic/GI:   GERD, well controlled    Musculoskeletal:  Musculoskeletal Normal    Endocrine:  Endocrine Normal    Dermatological:  Skin Normal    Psych:  Psychiatric Normal           Physical Exam  General:  Well nourished    Airway/Jaw/Neck:  Airway Findings: Mouth Opening: Normal Tongue: Normal  General Airway Assessment: Adult  Mallampati: III  Improves to II with phonation.  TM Distance: Normal, at least 6 cm  Jaw/Neck Findings:  Neck ROM: Normal ROM      Dental:  Dental Findings: In tact   Chest/Lungs:  Chest/Lungs Findings: Clear to auscultation, Normal Respiratory Rate     Heart/Vascular:  Heart Findings: Rate: Normal  Rhythm: Regular Rhythm  Sounds: Normal        Mental Status:  Mental Status Findings:  Cooperative, Alert and Oriented         Anesthesia Plan  Type of Anesthesia, risks & benefits discussed:  Anesthesia Type:  general, MAC, regional  Patient's Preference:   Intra-op Monitoring Plan: standard ASA monitors  Intra-op Monitoring Plan Comments:   Post Op Pain Control Plan: multimodal analgesia, IV/PO Opioids PRN, per primary service following discharge from PACU and peripheral nerve block  Post Op Pain Control Plan Comments:   Induction:    IV  Beta Blocker:  Patient is not currently on a Beta-Blocker (No further documentation required).       Informed Consent: Patient understands risks and agrees with Anesthesia plan.  Questions answered. Anesthesia consent signed with patient.  ASA Score: 2     Day of Surgery Review of History & Physical:    H&P update referred to the surgeon.         Ready For Surgery From Anesthesia Perspective.

## 2019-01-11 NOTE — BRIEF OP NOTE
[facility]  Brief Operative Note     SUMMARY     Surgery Date: 1/11/2019     Surgeon(s) and Role:     * India Dai MD - Primary     * Gonsalo Enrique MD - Resident - Assisting        Pre-op Diagnosis:  Closed fracture of distal end of left radius, unspecified fracture morphology, initial encounter [S52.502A]  Closed fracture of distal end of left ulna, unspecified fracture morphology, initial encounter [S52.602A]    Post-op Diagnosis:  Post-Op Diagnosis Codes:     * Closed fracture of distal end of left radius, unspecified fracture morphology, initial encounter [S52.502A]     * Closed fracture of distal end of left ulna, unspecified fracture morphology, initial encounter [S52.602A]    Procedure(s) (LRB):  ORIF, FRACTURE, RADIUS, DISTAL LEFT (Left)    Anesthesia: Regional    Description of the findings of the procedure: ORIF left distal radius    Findings/Key Components: ORIF left distal radius    Estimated Blood Loss: * No values recorded between 1/11/2019  3:27 PM and 1/11/2019  4:38 PM *         Specimens:   Specimen (12h ago, onward)    None          Discharge Note    SUMMARY     Admit Date: 1/11/2019    Discharge Date and Time:  01/11/2019     Hospital Course (synopsis of major diagnoses, care, treatment, and services provided during the course of the hospital stay): see op note     Final Diagnosis: Post-Op Diagnosis Codes:     * Closed fracture of distal end of left radius, unspecified fracture morphology, initial encounter [S52.502A]     * Closed fracture of distal end of left ulna, unspecified fracture morphology, initial encounter [S52.602A]    Disposition: Home or Self Care    Follow Up/Patient Instructions:     Medications:  Reconciled Home Medications:      Medication List      START taking these medications    ascorbic acid (vitamin C) 500 MG tablet  Commonly known as:  VITAMIN C  Take 1 tablet (500 mg total) by mouth once daily.     oxyCODONE-acetaminophen 5-325  mg per tablet  Commonly known as:  PERCOCET  Take 1 tablet by mouth every 6 (six) hours as needed for Pain.        CONTINUE taking these medications    ALPRAZolam 0.5 MG tablet  Commonly known as:  XANAX  Take 1 tablet (0.5 mg total) by mouth daily as needed for Anxiety. 1 Tablet Oral Every day     calcium-vitamin D3 500 mg(1,250mg) -200 unit per tablet  Commonly known as:  OS-SHAMIKA 500 + D3  Take 1 tablet by mouth 2 (two) times daily with meals.     estradiol 2 MG tablet  Commonly known as:  ESTRACE  Take 1 tablet (2 mg total) by mouth once daily.     glucosamine-chondroitin 500-400 mg tablet  Take 1 tablet by mouth 3 (three) times daily.     multivitamin capsule  Take 1 capsule by mouth once daily.     ondansetron 4 MG tablet  Commonly known as:  ZOFRAN  Take 1 tablet (4 mg total) by mouth every 6 (six) hours.        STOP taking these medications    HYDROcodone-acetaminophen 5-325 mg per tablet  Commonly known as:  NORCO          Discharge Procedure Orders   Call MD for:  temperature >100.4     Call MD for:  persistent nausea and vomiting or diarrhea     Call MD for:  redness, tenderness, or signs of infection (pain, swelling, redness, odor or green/yellow discharge around incision site)     Call MD for:  difficulty breathing or increased cough     Call MD for:  severe persistent headache     Call MD for:  worsening rash     Call MD for:  persistent dizziness, light-headedness, or visual disturbances     Call MD for:  increased confusion or weakness     Leave dressing on - Keep it clean, dry, and intact until clinic visit     Weight bearing restrictions (specify):   Order Comments: Nonweight bearing operative extremity     Follow-up Information     SHARON Jeffery. Go on 1/24/2019.    Specialties:  Hand Surgery, Orthopedic Surgery  Why:  For wound re-check, 9:15 am  Contact information:  [address]  [address] [id number] [phone]

## 2019-01-11 NOTE — PROGRESS NOTES
Patient unable to void for UPT_ states that her last menstrual cycle was approx. 6 months ago. Patient states there is no possible way for her to be pregnant- Dr. Bean notified - stated no UPT was needed.

## 2019-01-11 NOTE — ANESTHESIA PROCEDURE NOTES
Left Supraclavicular Catheter    Patient location during procedure: pre-op   Block not for primary anesthetic.  Reason for block: at surgeon's request and post-op pain management   Post-op Pain Location: Left arm pain   Start time: 1/11/2019 2:30 PM  Timeout: 1/11/2019 2:30 PM   End time: 1/11/2019 2:45 PM  Staffing  Anesthesiologist: Mary Valdivia MD  Resident/CRNA: Karen Stafford DO  Other anesthesia staff: Albin Dean Jr., MD  Performed: resident/CRNA   Preanesthetic Checklist  Completed: patient identified, site marked, surgical consent, pre-op evaluation, timeout performed, IV checked, risks and benefits discussed and monitors and equipment checked  Peripheral Block  Patient position: sitting  Prep: ChloraPrep and site prepped and draped  Patient monitoring: heart rate, cardiac monitor, continuous pulse ox, continuous capnometry and frequent blood pressure checks  Block type: supraclavicular  Laterality: left  Injection technique: continuous  Needle  Needle type: Tuohy   Needle gauge: 17 G  Needle length: 3.5 in  Needle localization: anatomical landmarks and ultrasound guidance  Catheter type: spring wound  Catheter size: 19 G  Test dose: lidocaine 1.5% with Epi 1-to-200,000 and negative   -ultrasound image captured on disc.  Assessment  Injection assessment: negative aspiration, negative parasthesia and local visualized surrounding nerve  Paresthesia pain: none  Heart rate change: no  Slow fractionated injection: yes  Additional Notes  VSS.  [facility] RN monitoring vitals throughout procedure.  Patient tolerated procedure well.

## 2019-01-11 NOTE — INTERVAL H&P NOTE
The patient has been examined and the H&P has been reviewed:    I concur with the findings and no changes have occurred since H&P was written. Numbness has resolved once splint changed per pt.     Anesthesia/Surgery risks, benefits and alternative options discussed and understood by patient/family.          There are no hospital problems to display for this patient.

## 2019-01-12 ENCOUNTER — ANESTHESIA (OUTPATIENT)
Dept: ANESTHESIOLOGY | Facility: HOSPITAL | Age: 54
End: 2019-01-12

## 2019-01-12 ENCOUNTER — ANESTHESIA EVENT (OUTPATIENT)
Dept: ANESTHESIOLOGY | Facility: HOSPITAL | Age: 54
End: 2019-01-12

## 2019-01-12 NOTE — ANESTHESIA POST-OP PAIN MANAGEMENT
01/12/2019    Called and spoke to patient about OnQ PNC.  Pain well controlled.  Denies tinnitus, metallic taste in mouth, weakness in extremity.  Denies erythema, warmth, tenderness, bleeding at site of catheter entry.  Dressing c/d/i.  All questions answered.  Encouraged them to call the provided number if any issues arise.  Will call again tomorrow.    Александр Casey MD PGY3/CA2  Anesthesiology  [facility]  Pager: [phone]  Acute Pain Service / [facility]  Spectra: [phone]

## 2019-01-12 NOTE — ANESTHESIA POSTPROCEDURE EVALUATION
"Anesthesia Post Evaluation    Patient: Lety Vogel    Procedure(s) Performed: Procedure(s) (LRB):  ORIF, FRACTURE, RADIUS, DISTAL LEFT (Left)    Final Anesthesia Type: general  Patient location during evaluation: PACU  Patient participation: Yes- Able to Participate  Level of consciousness: awake and alert and oriented  Post-procedure vital signs: reviewed and stable  Pain management: adequate  Airway patency: patent  PONV status at discharge: No PONV  Anesthetic complications: no      Cardiovascular status: blood pressure returned to baseline and hemodynamically stable  Respiratory status: unassisted, spontaneous ventilation and room air  Hydration status: euvolemic  Follow-up not needed.        Visit Vitals  /86   Pulse 92   Temp 36.3 °C (97.3 °F) (Skin)   Resp 20   Ht 5' 6" (1.676 m)   Wt 90.7 kg (200 lb)   LMP 06/11/2018 (Approximate)   SpO2 100%   Breastfeeding? No   BMI 32.28 kg/m²       Pain/Jackie Score: Pain Rating Prior to Med Admin: 0 (1/11/2019  5:09 PM)  Pain Rating Post Med Admin: 0 (1/11/2019  3:00 PM)  Jackie Score: 10 (1/11/2019  4:45 PM)        "

## 2019-01-12 NOTE — OP NOTE
DATE OF PROCEDURE:  01/11/2019.    SERVICE:  Orthopedics.    ATTENDING SURGEON:  India Dai M.D.    RESIDENT SURGEON:  Gonsalo Enrique M.D. (RES).    PHYSICIAN'S ASSISTANT:  Esperanza Moore PA-C.    PREOPERATIVE DIAGNOSES:  left intraarticular four-part plus distal radius   fracture with combination.    POSTOPERATIVE DIAGNOSES: left intraarticular four-part plus distal radius   fracture with combination.    PROCEDURES PERFORMED:  1.  Open reduction and internal fixation of left four-part plus intraarticular   displaced distal radius fracture.  2.  Fluoro less than 1 hour.  3.  Long arm posterior splint, right arm.    ANESTHESIA:  Regional MAC.    FLUIDS:  Lactated Ringer's.    BLOOD LOSS:  None.  No blood was given.    TOURNIQUET TIME:  Less than 1 hour.    PACKS AND DRAINS:  None.    IMPLANTS:  Acumed volar plate for distal radius and screws.    COMPLICATIONS:  None.    INDICATIONS FOR PROCEDURE:  Ms. Vogel status post fall on her outstretched left   arm.  She sustained a displaced distal radius fracture.  On evaluation   radiographically, it is not reduced.  It was intra-articular and displaced.    After much discussion with the patient about options, surgical intervention was   deemed necessary.  Risk and benefits were explained to the patient in clinic.    Consents were performed in clinic.    PROCEDURE IN DETAIL:  After the correct site was marked with the patient's   participation in the holding area, the patient underwent regional anesthesia,   was brought to the Operating Room, placed in supine position, underwent MAC   anesthesia.  A well-padded nonsterile tourniquet was placed on the left upper   extremity.  The right upper extremity was prepped and draped in normal sterile   fashion.  A timeout was conducted for the correct site, procedure, and the   patient to be indicated.  Intravenous antibiotics were given to the patient   preoperatively.  The incision was marked out directly over the FCR  after the   timeout was conducted and the IV antibiotics were given.  The arm was   exsanguinated with Esmarch and tourniquet was inflated to 250 mmHg.  Incision   was made.  Careful dissection down the FCR tendon was maintained.  The FCR   tendon was gently retracted ulnarly to protect the median nerves.  The fascia   was sharply incised and using the surgeon's finger, the space of Parona was   opened up.  The pronator quadratus was evaluated.  It was sharply cut off the   radius and using a key elevator, it was freed from the distal radius.    Immediately, it could be seen that there was fracture fragment volarly.  Using   C-arm fluoroscopy, the ulnar side appeared to be caught in hinge on the shaft.    Therefore, using first manual reduction techniques and then using a Cove   elevator to shoehorn the fracture fragments back into position.  The fracture   was reduced and this was confirmed under C-arm fluoroscopy.  A volar plate was   then placed into position.  Under C-arm fluoroscopy, the K-wire was placed to   perform proper alignment.  Shaft screw was drilled and filled appropriately.    Screw was placed and again C-arm fluoroscopy was confirmed that the fracture was   reduced.  In addition, the plate was placed in good position.  An ulnar-sided   screw hole was then drilled and filled distally.  A compression screw was placed   to hold the plate into position.  The remaining distal screws were drilled and   filled with locking screws.  The compression screw was then removed and the   locking screw was placed.  The two remaining shaft screws were drilled and   filled appropriately.  Under C-arm fluoroscopy, multiple views were taken to   confirm that the hardware did not violate the articular cortex.  It is in good   position.  A 20-degree oblique view was taken to confirm no hardware penetration   into the articular surface.  Both flexion and full extension views were taken   to confirm the patient had good  range of motion.  There was no instability of   the flexion and extension and no step off.  Our attention was turned to the   DRUJ.  The patient did have an ulnar styloid fracture.  There was some   instability that was present.  Therefore, the decision was made to turn the   patient in supination to protect the DRUJ.  The area was irrigated with copious   amounts of normal saline.  Vicryl, Monocryl and Dermabond closed the skin.    Sterile dressing was applied.  Of note, the patient's soft tissue was soft and   not significantly swollen.  The patient was placed in the long arm splint,   tolerated the procedure well, and was brought to the Recovery Room in stable   condition.    POSTOPERATIVE PLAN FOR THIS PATIENT:  She is to keep the dressing clean, dry and   intact.  We will see her back at 2 weeks.  Splint is to be removed at that   time.  The patient will be sent to OT for Orthoplast to be splinted in   supination to protect the DRUJ.      LES/BALBINA  dd: 01/12/2019 08:10:15 (CST)  td: 01/12/2019 09:01:52 (CST)  Doc ID   #[id number]  Job ID #[id number]    CC:

## 2019-01-13 NOTE — ANESTHESIA POST-OP PAIN MANAGEMENT
Called patient at [phone]. Patient doing well and plans to remove PNC near 1530 today. Reiterated instructions for PNC removal and to confirm and intact blue tip at time of removal. Also referred her to the pamphlet given at discharge for those same instructions. PNC removed with blue tip intact. Encouraged patient to call with any further questions/concerns.

## 2019-01-14 ENCOUNTER — PATIENT MESSAGE (OUTPATIENT)
Dept: ADMINISTRATIVE | Facility: OTHER | Age: 54
End: 2019-01-14

## 2019-01-24 ENCOUNTER — DOCUMENTATION ONLY (OUTPATIENT)
Dept: ORTHOPEDICS | Facility: CLINIC | Age: 54
End: 2019-01-24

## 2019-01-24 ENCOUNTER — OFFICE VISIT (OUTPATIENT)
Dept: ORTHOPEDICS | Facility: CLINIC | Age: 54
End: 2019-01-24
Payer: COMMERCIAL

## 2019-01-24 ENCOUNTER — PATIENT MESSAGE (OUTPATIENT)
Dept: ADMINISTRATIVE | Facility: OTHER | Age: 54
End: 2019-01-24

## 2019-01-24 ENCOUNTER — PATIENT MESSAGE (OUTPATIENT)
Dept: OBSTETRICS AND GYNECOLOGY | Facility: CLINIC | Age: 54
End: 2019-01-24

## 2019-01-24 VITALS
SYSTOLIC BLOOD PRESSURE: 126 MMHG | WEIGHT: 200 LBS | DIASTOLIC BLOOD PRESSURE: 82 MMHG | BODY MASS INDEX: 32.14 KG/M2 | HEIGHT: 66 IN | HEART RATE: 92 BPM

## 2019-01-24 DIAGNOSIS — S52.502A CLOSED FRACTURE OF DISTAL END OF LEFT RADIUS, UNSPECIFIED FRACTURE MORPHOLOGY, INITIAL ENCOUNTER: Primary | ICD-10-CM

## 2019-01-24 PROCEDURE — 29105 APPLICATION LONG ARM SPLINT: CPT | Mod: 58,LT,S$GLB, | Performed by: PHYSICIAN ASSISTANT

## 2019-01-24 PROCEDURE — 29105 PR APPLY LONG ARM SPLINT: ICD-10-PCS | Mod: 58,LT,S$GLB, | Performed by: PHYSICIAN ASSISTANT

## 2019-01-24 PROCEDURE — 99024 PR POST-OP FOLLOW-UP VISIT: ICD-10-PCS | Mod: S$GLB,,, | Performed by: PHYSICIAN ASSISTANT

## 2019-01-24 PROCEDURE — 99024 POSTOP FOLLOW-UP VISIT: CPT | Mod: S$GLB,,, | Performed by: PHYSICIAN ASSISTANT

## 2019-01-24 PROCEDURE — 99999 PR PBB SHADOW E&M-EST. PATIENT-LVL IV: ICD-10-PCS | Mod: PBBFAC,,, | Performed by: PHYSICIAN ASSISTANT

## 2019-01-24 PROCEDURE — 99999 PR PBB SHADOW E&M-EST. PATIENT-LVL IV: CPT | Mod: PBBFAC,,, | Performed by: PHYSICIAN ASSISTANT

## 2019-01-24 NOTE — PROGRESS NOTES
"Ms. Vogel is here today for a post-operative visit.  She is 13 days status post Open reduction and internal fixation of left four-part plus intraarticular displaced distal radius fracture by Dr. Dai on 1/11/19. She reports that she is doing well.  Pain is mild and well controlled.  She is not taking pain medication, reports that she has not needed it for the past week.  She denies fever, chills, and sweats since the time of the surgery.     Physical exam:    Vitals:    01/24/19 0933   BP: 126/82   Pulse: 92   Weight: 90.7 kg (200 lb)   Height: 5' 6" (1.676 m)     Vital signs are stable, patient is afebrile.  Patient is well dressed and well groomed, no acute distress.  Alert and oriented to person, place, and time.  Post op dressing taken down.  Incision is clean, dry and intact. Mild ecchymosis. Mild-moderate edema of the fingers. There is no erythema or exudate.  There is no sign of any infection. She is NVI. Suture tails removed without difficulty.  Fair finger ROM.    Assessment: 13 days status post Open reduction and internal fixation of left four-part plus intraarticular displaced distal radius fracture        Plan:  Lety was seen today for post-op evaluation.    Diagnoses and all orders for this visit:    Closed fracture of distal end of left radius, unspecified fracture morphology, initial encounter  -     X-Ray Wrist Complete Left; Future  -     Ambulatory Referral to Physical/Occupational Therapy        - PO instruction reviewed and provided to patient  - Custom orthoplast splint, long arm keeping patient in supination left arm  - orders placed for OT  - Discussed use of a compression glove  - No lifting or weight bearing  - Follow up in 4 weeks with X-Ray  - Call with questions or concerns      "

## 2019-01-25 DIAGNOSIS — Z12.31 VISIT FOR SCREENING MAMMOGRAM: Primary | ICD-10-CM

## 2019-01-30 ENCOUNTER — CLINICAL SUPPORT (OUTPATIENT)
Dept: REHABILITATION | Facility: HOSPITAL | Age: 54
End: 2019-01-30
Attending: PHYSICIAN ASSISTANT
Payer: COMMERCIAL

## 2019-01-30 DIAGNOSIS — M25.642 STIFFNESS OF LEFT HAND, NOT ELSEWHERE CLASSIFIED: ICD-10-CM

## 2019-01-30 DIAGNOSIS — M25.442 EFFUSION, LEFT HAND: ICD-10-CM

## 2019-01-30 DIAGNOSIS — M25.432 EFFUSION, LEFT WRIST: ICD-10-CM

## 2019-01-30 DIAGNOSIS — M25.632 STIFFNESS OF LEFT WRIST, NOT ELSEWHERE CLASSIFIED: ICD-10-CM

## 2019-01-30 PROCEDURE — 97165 OT EVAL LOW COMPLEX 30 MIN: CPT

## 2019-01-30 PROCEDURE — L3763 EWHO RIGID W/O JNTS CF: HCPCS

## 2019-01-30 PROCEDURE — 97110 THERAPEUTIC EXERCISES: CPT

## 2019-01-30 NOTE — PLAN OF CARE
[facility] Occupational Therapy  Initial Evaluation     Date: 1/30/2019  Name: Lety Vogel  Clinic Number: [id number]    Therapy Diagnosis:   Encounter Diagnoses   Name Primary?    Stiffness of left wrist, not elsewhere classified     Stiffness of left hand, not elsewhere classified     Effusion, left hand     Effusion, left wrist      Physician: Esperanza Moore PA    Physician Orders: Custom orthotic- splint in supination to protect DRUJ  Eval and treat, modalities as needed  1-2x/wk for 6+ weeks  Medical Diagnosis: Closed fracture of distal end of left radius, unspecified fracture morphology, initial encounter  Surgical Procedure and Date: ORIF AMIRA GUERRERO, 1/11/19  Evaluation Date: 1/30/19  Insurance Authorization Period Expiration: 12/31/19  Plan of Care Certification Period: 6 weeks (1/30/19-3/13/19)  Date of Return to MD: 2/21/19    Visit # / Visits authorized: 1 / 50 (50 OT/PT combined max per year)  Time In:10am  Time Out: 11:15am  Total Billable Time: 55 minutes    Precautions:  Standard and DRUJ instability- no forearm rotation, splint in supination per MD orders    Subjective     Involved Side: LUE  Dominant Side: Right  Date of Onset: 1/4/19  Mechanism of Injury: New injury  History of Current Condition: Pt reports she fell off of a ladder and  her wrist. She underwent surgical repair with Dr. Dai the following week.  Surgical Procedure: ORIF AMIRA GUERRERO    Past Medical History/Physical Systems Review:   Lety Vogel  has a past medical history of Anxiety, Early menopause, and GERD (gastroesophageal reflux disease).    Lety Vogel  has a past surgical history that includes none; Knee surgery; and open reduction and internal fixation (orif) of fracture of distal radius (Left, 1/11/2019).    Lety has a current medication list which includes the following prescription(s): alprazolam, ascorbic acid (vitamin c), calcium-vitamin d3, estradiol,  "glucosamine-chondroitin, multivitamin, ondansetron, and oxycodone-acetaminophen.    Review of patient's allergies indicates:  No Known Allergies     Patient's Goals for Therapy: "To get better so I can use my arm and get back to travelling"    Pain:  Pain: Mild      Occupation:  Healthcare IT      Functional Limitations/Social History:    Previous functional status includes: Independent with all ADLs.         Objective     Observation/Appearance: Pt presents with surgical dressing intact and shoulder sling donned. Upon removal, surgical incision over distal radius appears well healed. Moderate to significant diffuse edema throughout hand and arm.    Edema. Measured in centimeters.   1/30/2019 1/30/2019    Left Right   Proximal Wrist Crease 18.5 16.8     Hand/Wrist/Elbow AROM:  R Elbow ext/flex= -15/135  R wrist ext/flex= 12/10  R thumb MP/IP flex= 35/12  R comp. Finger flexion to DPC= 5 cm  Approx. 30 degree extension deficit of MP extension digits 2-4  Approx. 10 degree extension deficit of PIP extension digits 2-4             Strength (Dyanmometer) and Pinch Strength (Pinch Gauge)  Measured in pounds and psi. Average of three trials.  *Deferred per protocol*     1/30/2019 1/30/2019    Left Right   Rung II     Arthur Pinch     3pt Pinch     2pt Pinch                 CMS Impairment/Limitation/Restriction for FOTO Wrist Survey    Therapist reviewed FOTO scores for Lety Vogel on 1/30/2019.   FOTO documents entered into [employer] - see Media section.    Limitation Score: 62%  Category: Carrying    Current : CL = least 60% but < 80% impaired, limited or restricted  Goal: CJ = at least 20% but < 40% impaired, limited or restricted           Treatment     Treatment Time In: 10:20am  Treatment Time Out: 11:15am  Total Treatment time separate from Evaluation time:55        Lety received therapeutic exercises for 15 minutes including:  -HEP instruction/demo        Home Exercise Program/Education:  Issued HEP (see " patient instructions in EMR) and educated on modality use for pain management . Exercises were reviewed and Lety was able to demonstrate them prior to the end of the session.   Pt received a written copy of exercises to perform at home. Lety demonstrated good  understanding of the education provided.  Pt was advised to perform these exercises free of pain, and to stop performing them if pain occurs.    Patient/Family Education: role of OT, goals for OT, scheduling/cancellations - pt verbalized understanding. Discussed insurance limitations with patient.    Additional Education provided: Splint use/care, surgical precautions/contraindications    Assessment     Lety Vogel is a 53 y.o. female presenting to OT 2 weeks 5 days s/p L ORIF DR with DRUJ involvement. She demos diffuse edema, decreased ROM, decreased strength, and increased pain, which limits functional use of LUE during daily activities.    Following medical record review it is determined that pt will benefit from occupational therapy services in order to maximize pain free and/or functional use of left UE. The following goals were discussed with the patient and patient is in agreement with them as to be addressed in the treatment plan. The patient's rehab potential is Good.     Anticipated barriers to occupational therapy: none at this time  Pt has no cultural, educational or language barriers to learning provided.    Profile and History Assessment of Occupational Performance Level of Clinical Decision Making Complexity Score   Occupational Profile:   Lety Vogel is a 53 y.o. female who lives with their spouse and is currently employed as Healthcare IT worker. Lety Vogel has difficulty with  ADLs  IADLs  affecting his/her daily functional abilities. His/her main goal for therapy is independence with previous activities.     Comorbidities:   see medical chart    Medical and Therapy History Review:   Brief                Performance Deficits    Physical:  Decreased ROM, Edema, Pain, Weakness    Cognitive:  No Deficits    Psychosocial:    No Deficits     Clinical Decision Making:  low    Assessment Process:  Problem-Focused Assessments    Modification/Need for Assistance:  Not Necessary    Intervention Selection:  Limited Treatment Options       low  Based on PMHX, co morbidities , data from assessments and functional level of assistance required with task and clinical presentation directly impacting function.       The following goals were discussed with the patient and patient is in agreement with them as to be addressed in the treatment plan.     Goals:   STG1: [place] with HEP (1 week)  STG2: Decrease edema to mild levels (2 weeks)  STG3: Increase finger flexion to DPC (2 weeks)  STG4: Increase wrist flex/ext ROM to 75% WNL (4 weeks)    LTG1: Increase wrist/elbow/finger AROM to WNL (discharge)  LTG2: Return to previous activities safely and independently (discharge)      Plan   Certification Period/Plan of care expiration: 1/30/2019 to 3/8/19.    Outpatient Occupational Therapy 2 times weekly for 6 weeks to include the following interventions: Paraffin, Fluidotherapy, Manual therapy/joint mobilizations, Modalities for pain management, Therapeutic exercises/activities., Strengthening, Orthotic Fabrication/Fit/Training, Edema Control and Scar Management.      Brooklynn Whelan, OT

## 2019-01-30 NOTE — PATIENT INSTRUCTIONS
"Flexor Tendon Gliding (Active Straight Fist)    Scar Tissue Massage        Place pad of fingertip on scar area. Apply steady downward pressure while moving in circular fashion. Use another fin-fredrick on top to assist. Repeat until entire scar has been covered.  Repeat ____ times. Do ____ sessions per day.    Copyright © [facility]. All rights reserved.       Start with fingers straight. Bend knuckles and middle joints. Keep fingertip joints straight to touch base of palm.  Repeat ____ times. Do ____ sessions per day.    Copyright © I. All rights reserved.   Flexor Tendon Gliding (Active Full Fist)        Straighten all fingers, then make a fist, bending all joints.  Repeat ____ times. Do ____ sessions per day.    Copyright © I. All rights reserved.   Flexor Tendon Gliding (Active Hook Fist)        With fingers and knuckles straight, bend middle and tip joints. Do not bend large knuckles.  Repeat ____ times. Do ____ sessions per day.    Copyright © [facility]. All rights reserved.   Opposition (Active)        Touch tip of thumb to nail tip of each finger in turn, making an "O" shape.  Repeat ____ times. Do ____ sessions per day.    Copyright © [facility]. All rights reserved.   Wrist, AROM Flex / Extend        Elbows bent to 90° at sides, palms down. Use armrests on chair if more comfortable. Bend wrists downward. Then bend wrists upward. Hold each position ___ seconds.  Repeat ___ times per session. Do ___ sessions per day.    Copyright © [facility]. All rights reserved.   AROM: Elbow Flexion / Extension        With left hand palm up, gently bend elbow as far as possible. Then straighten arm as far as possible.  Repeat ____ times per set. Do ____ sets per session. Do ____ sessions per day.    Copyright © I. All rights reserved.     "

## 2019-01-31 ENCOUNTER — OFFICE VISIT (OUTPATIENT)
Dept: OBSTETRICS AND GYNECOLOGY | Facility: CLINIC | Age: 54
End: 2019-01-31
Attending: OBSTETRICS & GYNECOLOGY
Payer: COMMERCIAL

## 2019-01-31 ENCOUNTER — HOSPITAL ENCOUNTER (OUTPATIENT)
Dept: RADIOLOGY | Facility: OTHER | Age: 54
Discharge: HOME | End: 2019-01-31
Attending: OBSTETRICS & GYNECOLOGY
Payer: COMMERCIAL

## 2019-01-31 DIAGNOSIS — N95.1 PERIMENOPAUSE: ICD-10-CM

## 2019-01-31 DIAGNOSIS — Z12.31 VISIT FOR SCREENING MAMMOGRAM: ICD-10-CM

## 2019-01-31 DIAGNOSIS — N92.6 IRREGULAR MENSES: ICD-10-CM

## 2019-01-31 DIAGNOSIS — Z01.419 WELL WOMAN EXAM WITH ROUTINE GYNECOLOGICAL EXAM: Primary | ICD-10-CM

## 2019-01-31 DIAGNOSIS — N92.1 MENORRHAGIA WITH IRREGULAR CYCLE: ICD-10-CM

## 2019-01-31 DIAGNOSIS — Z12.4 PAP SMEAR FOR CERVICAL CANCER SCREENING: ICD-10-CM

## 2019-01-31 PROCEDURE — 77063 BREAST TOMOSYNTHESIS BI: CPT | Mod: 26,,, | Performed by: RADIOLOGY

## 2019-01-31 PROCEDURE — 77067 SCR MAMMO BI INCL CAD: CPT | Mod: TC

## 2019-01-31 PROCEDURE — 88175 CYTOPATH C/V AUTO FLUID REDO: CPT

## 2019-01-31 PROCEDURE — 77067 SCR MAMMO BI INCL CAD: CPT | Mod: 26,,, | Performed by: RADIOLOGY

## 2019-01-31 PROCEDURE — 99386 PR PREVENTIVE VISIT,NEW,40-64: ICD-10-PCS | Mod: S$GLB,,, | Performed by: OBSTETRICS & GYNECOLOGY

## 2019-01-31 PROCEDURE — 99386 PREV VISIT NEW AGE 40-64: CPT | Mod: S$GLB,,, | Performed by: OBSTETRICS & GYNECOLOGY

## 2019-01-31 PROCEDURE — 77067 MAMMO DIGITAL SCREENING BILAT WITH TOMOSYNTHESIS_CAD: ICD-10-PCS | Mod: 26,,, | Performed by: RADIOLOGY

## 2019-01-31 PROCEDURE — 77063 MAMMO DIGITAL SCREENING BILAT WITH TOMOSYNTHESIS_CAD: ICD-10-PCS | Mod: 26,,, | Performed by: RADIOLOGY

## 2019-01-31 PROCEDURE — 87624 HPV HI-RISK TYP POOLED RSLT: CPT

## 2019-01-31 RX ORDER — PROGESTERONE 100 MG/1
100 CAPSULE ORAL NIGHTLY
Qty: 12 CAPSULE | Refills: 11 | Status: SHIPPED | OUTPATIENT
Start: 2019-01-31 | End: 2019-01-31

## 2019-01-31 RX ORDER — PROGESTERONE 100 MG/1
100 CAPSULE ORAL NIGHTLY
Qty: 30 CAPSULE | Refills: 11 | Status: SHIPPED | OUTPATIENT
Start: 2019-01-31 | End: 2020-03-18

## 2019-01-31 NOTE — PROGRESS NOTES
Subjective:       Patient ID: Lety Vogel is a 53 y.o. female.    Chief Complaint:  No chief complaint on file.      History of Present Illness  HPI  This 53 yr old P0 female is here for routine exam.  She had last pap .  Mammogram is scheduled.  She is taking estrace 2mg daily and has not had hyst.  She is not taking progesterone but was having normal menses until about yr and half ago and now is getting heavier spacing out over 4 to 6 months then two back to back and heavy.  Was having heavy menses about first two days but now 4 or 5.  We discussed the importance of progesterone with estrogen and will start now and get ultrasound just to make sure lining ok.  She understands and agrees.  She otherwise is doing well and has no complaints.  Has broken left arm in cast.  She has never had abnormal pap      GYN & OB History  No LMP recorded.   Date of Last Pap: 2016    OB History    Para Term  AB Living   0 0 0 0 0 0   SAB TAB Ectopic Multiple Live Births   0 0 0 0               Review of Systems  Review of Systems   Constitutional: Negative for chills and fever.   Respiratory: Negative for shortness of breath.    Cardiovascular: Negative for chest pain.   Gastrointestinal: Negative for abdominal pain, nausea and vomiting.   Genitourinary: Positive for menstrual problem. Negative for difficulty urinating, dyspareunia, genital sores, pelvic pain, vaginal bleeding, vaginal discharge and vaginal pain.   Skin: Negative for wound.   Hematological: Negative for adenopathy.           Objective:   Physical Exam:   Constitutional: She is oriented to person, place, and time. She appears well-developed and well-nourished.    HENT:   Head: Normocephalic.    Eyes: EOM are normal.    Neck: Normal range of motion.    Cardiovascular: Normal rate.     Pulmonary/Chest: Effort normal. She exhibits no mass and no tenderness. Right breast exhibits no inverted nipple, no mass, no skin change and no tenderness.  Left breast exhibits no inverted nipple, no mass, no skin change and no tenderness.        Abdominal: Soft. She exhibits no distension. There is no tenderness.     Genitourinary: Vagina normal and uterus normal. There is no rash, tenderness or lesion on the right labia. There is no rash, tenderness or lesion on the left labia. Uterus is not tender. Cervix is normal. Right adnexum displays no mass, no tenderness and no fullness. Left adnexum displays no mass, no tenderness and no fullness. Cervix exhibits no discharge.           Musculoskeletal: Normal range of motion.       Neurological: She is alert and oriented to person, place, and time.    Skin: Skin is warm and dry.    Psychiatric: She has a normal mood and affect.          Assessment:        1. Well woman exam with routine gynecological exam    2. Pap smear for cervical cancer screening    3. Irregular menses    4. Perimenopause    5. Menorrhagia with irregular cycle               Plan:      Will get ultrasound to look at lining  Pap today

## 2019-02-01 ENCOUNTER — CLINICAL SUPPORT (OUTPATIENT)
Dept: REHABILITATION | Facility: HOSPITAL | Age: 54
End: 2019-02-01
Attending: PHYSICIAN ASSISTANT
Payer: COMMERCIAL

## 2019-02-01 DIAGNOSIS — M25.432 EFFUSION, LEFT WRIST: ICD-10-CM

## 2019-02-01 DIAGNOSIS — M25.632 STIFFNESS OF LEFT WRIST, NOT ELSEWHERE CLASSIFIED: Primary | ICD-10-CM

## 2019-02-01 DIAGNOSIS — M25.642 STIFFNESS OF LEFT HAND, NOT ELSEWHERE CLASSIFIED: ICD-10-CM

## 2019-02-01 DIAGNOSIS — M25.532 LEFT WRIST PAIN: ICD-10-CM

## 2019-02-01 PROCEDURE — 97110 THERAPEUTIC EXERCISES: CPT

## 2019-02-01 NOTE — PROGRESS NOTES
Occupational Therapy Daily Treatment Note     Date: 2/1/2019  Name: Lety Luna [facility]  Clinic Number: [id number]    Therapy Diagnosis: No diagnosis found.  Physician: Esperanza Moore PA    Physician Orders: Custom orthotic- splint in supination to protect DRUJ  Eval and treat, modalities as needed  1-2x/wk for 6+ weeks  Medical Diagnosis: Closed fracture of distal end of left radius, unspecified fracture morphology, initial encounter  Surgical Procedure and Date: ORIF L , 1/11/19  Evaluation Date: 1/30/19  Insurance Authorization Period Expiration: 12/31/19  Plan of Care Certification Period: 6 weeks (1/30/19-3/13/19)  Date of Return to MD: 2/21/19     Visit # / Visits authorized: 2 / 50 (50 OT/PT combined max per year)  Time In:8:05am  Time Out: 9:05am  Total Billable Time: 45 minutes     Precautions:  Standard and DRUJ instability- no forearm rotation, splint in supination per MD orders    Subjective     Pt reports she purchased an isotoner glove  But has not worn it yet.  she was compliant with home exercise program given last session.   Response to previous treatment:positive  Functional change: no    Pain: 7/10  Location: left UE      Objective     Lety received the following supervised modalities after being cleared for contradictions for 15 minutes:   -Patient received MH to increase blood flow, circulation and tissue elasticity prior to therex      Lety received therapeutic exercises for 45 minutes including:  -AROM wrist 4 planes with and without composite fist  -AROM digits- TGES, joint blocks, isolated FDS  -scar massage  -AROM thumb all planes  -passive finger extension, proximal to distal        Home Exercises and Education Provided     Education provided:  Added thumb IP joint blocking, radial abduction, peña abduction to HEP. Reviewed motion contraindications.      Written Home Exercises Provided: Patient instructed to cont prior HEP.  Exercises were reviewed and Lety was able to  demonstrate them prior to the end of the session.  Lety demonstrated good  understanding of the HEP provided.   .   See EMR under Patient Instructions for exercises provided on initial eval and 2/1/2019.        Assessment     Significant edema and soft tissue tightness limiting ROM.     Lety is progressing well towards her goals and there are no updates to goals at this time. Pt prognosis is Good.     Pt will continue to benefit from skilled outpatient occupational therapy to address the deficits listed in the problem list on initial evaluation provide pt/family education and to maximize pt's level of independence in the home and community environment.     Anticipated barriers to occupational therapy: work travelling conflicts    Pt's spiritual, cultural and educational needs considered and pt agreeable to plan of care and goals.    Goals:  STG1: [place] with HEP (1 week)  STG2: Decrease edema to mild levels (2 weeks)  STG3: Increase finger flexion to DPC (2 weeks)  STG4: Increase wrist flex/ext ROM to 75% WNL (4 weeks)     LTG1: Increase wrist/elbow/finger AROM to WNL (discharge)  LTG2: Return to previous activities safely and independently (discharge)    Plan   Continue OT 2 times weekly for 6 weeks (1/31/19-3/8/19)  Updates/Grading for next session: Discussed increasing frequency to 3x/wk if able      Brooklynn Whelan OT

## 2019-02-01 NOTE — PATIENT INSTRUCTIONS
AROM: Thumb Abduction / Adduction        Actively pull right thumb away from palm as far as possible. Hold ____ seconds. Then bring thumb back to touch fingers. Try not to bend fingers toward thumb.  Repeat ____ times per set. Do ____ sets per session. Do ____ sessions per day.    Copyright © [employer]. All rights reserved.   Composite Extension (Active)        Bring thumb up and out in hitchhiker position. Hold ____ seconds.  Repeat ____ times. Do ____ sessions per day.    Copyright © [employer]. All rights reserved.   IP Flexion (Active Blocked)        Brace thumb below tip joint. Bend joint as far as possible.  Repeat ____ times. Do ____ sessions per day.    Copyright © [employer]. All rights reserved.

## 2019-02-06 LAB
HPV HR 12 DNA CVX QL NAA+PROBE: NEGATIVE
HPV16 AG SPEC QL: NEGATIVE
HPV18 DNA SPEC QL NAA+PROBE: NEGATIVE

## 2019-02-07 ENCOUNTER — PATIENT MESSAGE (OUTPATIENT)
Dept: OBSTETRICS AND GYNECOLOGY | Facility: CLINIC | Age: 54
End: 2019-02-07

## 2019-02-08 ENCOUNTER — CLINICAL SUPPORT (OUTPATIENT)
Dept: REHABILITATION | Facility: HOSPITAL | Age: 54
End: 2019-02-08
Attending: PHYSICIAN ASSISTANT
Payer: COMMERCIAL

## 2019-02-08 DIAGNOSIS — M25.432 EFFUSION, LEFT WRIST: ICD-10-CM

## 2019-02-08 DIAGNOSIS — M25.442 EFFUSION, LEFT HAND: ICD-10-CM

## 2019-02-08 DIAGNOSIS — M25.532 LEFT WRIST PAIN: ICD-10-CM

## 2019-02-08 DIAGNOSIS — M25.632 STIFFNESS OF LEFT WRIST, NOT ELSEWHERE CLASSIFIED: Primary | ICD-10-CM

## 2019-02-08 DIAGNOSIS — M25.642 STIFFNESS OF LEFT HAND, NOT ELSEWHERE CLASSIFIED: ICD-10-CM

## 2019-02-08 PROCEDURE — 97110 THERAPEUTIC EXERCISES: CPT

## 2019-02-08 NOTE — PATIENT INSTRUCTIONS
.MP Flexion: Isolated        Position Patient: Place back of left hand on firm surface. Motion -Patient bends ______ finger at knuckle, keeping middle and end joints straight. - [place] holds other fingers in place.  Repeat ___ times. Repeat with ______ finger. Do ___ sessions per day.    Copyright © I. All rights reserved.   Extension (Active With Finger Flexion)        Con los dedos doblados, levante la antonio.  Mantenga ____ segundos. Repita ____ veces. Jimmie ____ sesiones por día.    Copyright © I. All rights reserved.

## 2019-02-08 NOTE — PROGRESS NOTES
Occupational Therapy Daily Treatment Note     Date: 2/8/2019  Name: Lety Dsouzah  Clinic Number: [id number]    Therapy Diagnosis:   Encounter Diagnoses   Name Primary?    Stiffness of left wrist, not elsewhere classified Yes    Stiffness of left hand, not elsewhere classified     Effusion, left wrist     Left wrist pain     Effusion, left hand      Physician: Esperanza Moore PA    Physician Orders: Custom orthotic- splint in supination to protect DRUJ  Eval and treat, modalities as needed  1-2x/wk for 6+ weeks  Medical Diagnosis: Closed fracture of distal end of left radius, unspecified fracture morphology, initial encounter  Surgical Procedure and Date: ORIF AMIRA GUERRERO, 1/11/19  Evaluation Date: 1/30/19  Insurance Authorization Period Expiration: 12/31/19  Plan of Care Certification Period: 6 weeks (1/30/19-3/13/19)  Date of Return to MD: 2/21/19     Visit # / Visits authorized: 3 / 50 (50 OT/PT combined max per year)  Time In:2:35pm  Time Out: 3:30pm  Total Billable Time: 40 minutes     Precautions:  Standard and DRUJ instability- no forearm rotation, splint in supination per MD orders    Subjective   Minimal improvement in pain.  she was compliant with home exercise program given last session.   Response to previous treatment:positive  Functional change: no    Pain: 7/10  Location: left UE      Objective     Lety received the following supervised modalities after being cleared for contradictions for 15 minutes:   -Patient received MH to increase blood flow, circulation and tissue elasticity prior to therex      Lety received therapeutic exercises for 40 minutes including:  -AROM wrist 4 planes with and without composite fist  -AROM digits- TGES, joint blocks, isolated FDS  -scar massage  -AROM thumb all planes  -passive finger extension, proximal to distal        Home Exercises and Education Provided     Education provided:  Continue current HEP      Written Home Exercises Provided: Patient  instructed to cont prior HEP.  Exercises were reviewed and Lety was able to demonstrate them prior to the end of the session.  Lety demonstrated good  understanding of the HEP provided.   .   See EMR under Patient Instructions for exercises provided on initial eval and 2/1/2019.        Assessment     Pt very painful with ROM. Minimal change in edema at this time. However, thumb AROM slightly improved; pt now able to oppose to tip of SF.    Lety is progressing well towards her goals and there are no updates to goals at this time. Pt prognosis is Good.     Pt will continue to benefit from skilled outpatient occupational therapy to address the deficits listed in the problem list on initial evaluation provide pt/family education and to maximize pt's level of independence in the home and community environment.     Anticipated barriers to occupational therapy: work travelling conflicts    Pt's spiritual, cultural and educational needs considered and pt agreeable to plan of care and goals.    Goals:  STG1: [place] with HEP (1 week)  STG2: Decrease edema to mild levels (2 weeks)  STG3: Increase finger flexion to DPC (2 weeks)  STG4: Increase wrist flex/ext ROM to 75% WNL (4 weeks)     LTG1: Increase wrist/elbow/finger AROM to WNL (discharge)  LTG2: Return to previous activities safely and independently (discharge)    Plan   Continue OT 2 times weekly for 6 weeks (1/31/19-3/8/19)  Updates/Grading for next session: Discussed increasing frequency to 3x/wk if able      Brooklynn Whelan OT

## 2019-02-15 ENCOUNTER — CLINICAL SUPPORT (OUTPATIENT)
Dept: REHABILITATION | Facility: HOSPITAL | Age: 54
End: 2019-02-15
Attending: PHYSICIAN ASSISTANT
Payer: COMMERCIAL

## 2019-02-15 DIAGNOSIS — M25.642 STIFFNESS OF LEFT HAND, NOT ELSEWHERE CLASSIFIED: ICD-10-CM

## 2019-02-15 DIAGNOSIS — M25.432 EFFUSION, LEFT WRIST: ICD-10-CM

## 2019-02-15 DIAGNOSIS — M25.442 EFFUSION, LEFT HAND: ICD-10-CM

## 2019-02-15 DIAGNOSIS — M25.532 LEFT WRIST PAIN: ICD-10-CM

## 2019-02-15 DIAGNOSIS — M25.632 STIFFNESS OF LEFT WRIST, NOT ELSEWHERE CLASSIFIED: Primary | ICD-10-CM

## 2019-02-15 PROCEDURE — 97110 THERAPEUTIC EXERCISES: CPT

## 2019-02-15 PROCEDURE — 97022 WHIRLPOOL THERAPY: CPT

## 2019-02-15 NOTE — PROGRESS NOTES
Occupational Therapy Daily Treatment Note     Date: 2/15/2019  Name: Lety Tovarogh  Clinic Number: [id number]    Therapy Diagnosis:   No diagnosis found.  Physician: Esperanza Moore PA    Physician Orders: Custom orthotic- splint in supination to protect DRUJ  Eval and treat, modalities as needed  1-2x/wk for 6+ weeks  Medical Diagnosis: Closed fracture of distal end of left radius, unspecified fracture morphology, initial encounter  Surgical Procedure and Date: ORIF L , 1/11/19  Evaluation Date: 1/30/19  Insurance Authorization Period Expiration: 12/31/19  Plan of Care Certification Period: 6 weeks (1/30/19-3/13/19)  Date of Return to MD: 2/21/19     Visit # / Visits authorized: 4 / 50 (50 OT/PT combined max per year)  Time In:11:10am  Time Out: 12pm  Total Billable Time: 50 minutes     Precautions:  Standard and DRUJ instability- no forearm rotation, splint in supination per MD orders    Subjective   Minimal improvement in pain.  she was compliant with home exercise program given last session.   Response to previous treatment:positive  Functional change: no    Pain: 7/10  Location: left UE      Objective     Lety received the following supervised modalities after being cleared for contradictions for 15 minutes:   Patient received fluidotherapy to increase blood flow, circulation, desensitization, sensory re-education and for pain management.       Lety received therapeutic exercises for 35 minutes including:  -AROM wrist 4 planes with and without composite fist  -AROM digits- TGES, joint blocks, isolated FDS, table top extension  -scar massage  -A/AAROM thumb all planes  -passive finger extension, proximal to distal  -opposition with beans  -towel walking x 3'    Home Exercises and Education Provided     Education provided:  Continue current HEP      Written Home Exercises Provided: Patient instructed to cont prior HEP.  Exercises were reviewed and Lety was able to demonstrate them prior to the  end of the session.  Lety demonstrated good  understanding of the HEP provided.   .   See EMR under Patient Instructions for exercises provided on initial eval and 2/1/2019.        Assessment     Continued c/o pain with AROM of wrist and thumb. Pt to increase frequency of OT to 3x/week at 1:1.    Lety is progressing well towards her goals and there are no updates to goals at this time. Pt prognosis is Good.     Pt will continue to benefit from skilled outpatient occupational therapy to address the deficits listed in the problem list on initial evaluation provide pt/family education and to maximize pt's level of independence in the home and community environment.     Anticipated barriers to occupational therapy: work travelling conflicts    Pt's spiritual, cultural and educational needs considered and pt agreeable to plan of care and goals.    Goals:  STG1: [place] with HEP (1 week)  STG2: Decrease edema to mild levels (2 weeks)  STG3: Increase finger flexion to DPC (2 weeks)  STG4: Increase wrist flex/ext ROM to 75% WNL (4 weeks)     LTG1: Increase wrist/elbow/finger AROM to WNL (discharge)  LTG2: Return to previous activities safely and independently (discharge)    Plan   Continue OT 2 times weekly for 6 weeks (1/31/19-3/8/19)  Updates/Grading for next session: Discussed increasing frequency to 3x/wk if able      Brooklynn Whelan OT

## 2019-02-18 ENCOUNTER — CLINICAL SUPPORT (OUTPATIENT)
Dept: REHABILITATION | Facility: HOSPITAL | Age: 54
End: 2019-02-18
Attending: PHYSICIAN ASSISTANT
Payer: COMMERCIAL

## 2019-02-18 DIAGNOSIS — M25.432 EFFUSION, LEFT WRIST: ICD-10-CM

## 2019-02-18 DIAGNOSIS — M25.642 STIFFNESS OF LEFT HAND, NOT ELSEWHERE CLASSIFIED: ICD-10-CM

## 2019-02-18 DIAGNOSIS — M25.442 EFFUSION, LEFT HAND: ICD-10-CM

## 2019-02-18 DIAGNOSIS — M25.632 STIFFNESS OF LEFT WRIST, NOT ELSEWHERE CLASSIFIED: ICD-10-CM

## 2019-02-18 PROCEDURE — 97110 THERAPEUTIC EXERCISES: CPT | Mod: PO

## 2019-02-18 PROCEDURE — 97022 WHIRLPOOL THERAPY: CPT | Mod: PO

## 2019-02-18 NOTE — PROGRESS NOTES
Occupational Therapy Daily Treatment Note     Date: 2/18/2019  Name: Lety Tovarogh  Clinic Number: [id number]    Therapy Diagnosis:   Encounter Diagnoses   Name Primary?    Stiffness of left wrist, not elsewhere classified     Stiffness of left hand, not elsewhere classified     Effusion, left hand     Effusion, left wrist      Physician: Esperanza Moore PA    Physician Orders: Custom orthotic- splint in supination to protect DRUJ  Eval and treat, modalities as needed  1-2x/wk for 6+ weeks  Medical Diagnosis: Closed fracture of distal end of left radius, unspecified fracture morphology, initial encounter  Surgical Procedure and Date: ORIF AMIRA GUERRERO, 1/11/19  Evaluation Date: 1/30/19  Insurance Authorization Period Expiration: 12/31/19  Plan of Care Certification Period: 6 weeks (1/30/19-3/13/19)  Date of Return to MD: 2/21/19     Visit # / Visits authorized: 5 / 50 (50 OT/PT combined max per year)  Time In:11:10am  Time Out: 12:10pm  Total Billable Time: 60 minutes     Precautions:  Standard and DRUJ instability- no forearm rotation, splint in supination per MD orders    Subjective   Minimal improvement in pain.  she was compliant with home exercise program given last session.   Response to previous treatment:positive  Functional change: no    Pain: 7/10  Location: left UE      Objective     Lety received the following supervised modalities after being cleared for contradictions for 20 minutes:   Patient received fluidotherapy to increase blood flow, circulation, desensitization, sensory re-education and for pain management.       Lety received therapeutic exercises for 40 minutes including:  -AROM wrist 4 planes with and without composite fist  -AROM digits- TGES, joint blocks, isolated FDS, table top extension  -scar massage  -A/AAROM thumb all planes  -passive finger extension, proximal to distal  -opposition with beans  -towel walking x 3'  -isolated EDC with dowel x 2'  -AROM wrist RD/Flex/Ext with  jodyel x 1' ea.    Home Exercises and Education Provided     Education provided:  Continue current HEP      Written Home Exercises Provided: Patient instructed to cont prior HEP.  Exercises were reviewed and Lety was able to demonstrate them prior to the end of the session.  Lety demonstrated good  understanding of the HEP provided.   .   See EMR under Patient Instructions for exercises provided on initial eval and 2/1/2019.        Assessment     Pt with improved composite extension ROM of thumb today per skilled observation. Thumb IP AROM limited due to decreased tendon glide of the FPL. Discussed increasing frequency of thumb IP joint blocking exercises with HEP.    Lety is progressing well towards her goals and there are no updates to goals at this time. Pt prognosis is Good.     Pt will continue to benefit from skilled outpatient occupational therapy to address the deficits listed in the problem list on initial evaluation provide pt/family education and to maximize pt's level of independence in the home and community environment.     Anticipated barriers to occupational therapy: work travelling conflicts    Pt's spiritual, cultural and educational needs considered and pt agreeable to plan of care and goals.    Goals:  STG1: [place] with HEP (1 week)-Met  STG2: Decrease edema to mild levels (2 weeks)-Progressing  STG3: Increase finger flexion to DPC (2 weeks)-Progressing  STG4: Increase wrist flex/ext ROM to 75% WNL (4 weeks)-Progressing     LTG1: Increase wrist/elbow/finger AROM to WNL (discharge)  LTG2: Return to previous activities safely and independently (discharge)    Plan   Continue OT 2 times weekly for 6 weeks (1/31/19-3/8/19)  Updates/Grading for next session: Discussed increasing frequency to 3x/wk if able      Brooklynn Whelan OT

## 2019-02-20 ENCOUNTER — CLINICAL SUPPORT (OUTPATIENT)
Dept: REHABILITATION | Facility: HOSPITAL | Age: 54
End: 2019-02-20
Attending: PHYSICIAN ASSISTANT
Payer: COMMERCIAL

## 2019-02-20 DIAGNOSIS — M25.632 STIFFNESS OF LEFT WRIST, NOT ELSEWHERE CLASSIFIED: ICD-10-CM

## 2019-02-20 DIAGNOSIS — M25.442 EFFUSION, LEFT HAND: ICD-10-CM

## 2019-02-20 DIAGNOSIS — M25.432 EFFUSION, LEFT WRIST: ICD-10-CM

## 2019-02-20 DIAGNOSIS — M25.642 STIFFNESS OF LEFT HAND, NOT ELSEWHERE CLASSIFIED: ICD-10-CM

## 2019-02-20 PROCEDURE — 97110 THERAPEUTIC EXERCISES: CPT

## 2019-02-20 PROCEDURE — 97032 APPL MODALITY 1+ESTIM EA 15: CPT

## 2019-02-20 PROCEDURE — 97022 WHIRLPOOL THERAPY: CPT

## 2019-02-20 NOTE — PROGRESS NOTES
Occupational Therapy Daily Treatment Note     Date: 2/20/2019  Name: Lety Tovarogh  Clinic Number: [id number]    Therapy Diagnosis:   Encounter Diagnoses   Name Primary?    Stiffness of left wrist, not elsewhere classified     Stiffness of left hand, not elsewhere classified     Effusion, left hand     Effusion, left wrist      Physician: Esperanza Moore PA    Physician Orders: Custom orthotic- splint in supination to protect DRUJ  Eval and treat, modalities as needed  1-2x/wk for 6+ weeks  Medical Diagnosis: Closed fracture of distal end of left radius, unspecified fracture morphology, initial encounter  Surgical Procedure and Date: ORIF AMIRA GUERRERO, 1/11/19  Evaluation Date: 1/30/19  Insurance Authorization Period Expiration: 12/31/19  Plan of Care Certification Period: 6 weeks (1/30/19-3/13/19)  Date of Return to MD: 2/21/19     Visit # / Visits authorized: 5 / 50 (50 OT/PT combined max per year)  Time In:11:00am  Time Out: 12:10pm  Total Billable Time: 70 minutes     Precautions:  Standard and DRUJ instability- no forearm rotation, splint in supination per MD orders    Subjective   No new complaints today.  she was compliant with home exercise program given last session.   Response to previous treatment:positive  Functional change: no    Pain: 7/10  Location: left UE      Objective     Lety received the following supervised modalities after being cleared for contradictions for 20 minutes:   Patient received fluidotherapy to increase blood flow, circulation, desensitization, sensory re-education and for pain management.     Lety received the following direct contact modalities after being cleared for contraindications for 15 minutes:  -NMES to FPL x 15', active flexion and passive extension , to increase composite thumb flexion      Lety received therapeutic exercises for 35 minutes including:  -AROM wrist 4 planes with and without composite fist  -AROM digits- TGES, joint blocks, isolated FDS,  table top extension  -scar massage  -A/AAROM thumb all planes  -passive finger extension, proximal to distal  -opposition with beans        Home Exercises and Education Provided     Education provided:  Continue current HEP      Written Home Exercises Provided: Patient instructed to cont prior HEP.  Exercises were reviewed and Lety was able to demonstrate them prior to the end of the session.  Lety demonstrated good  understanding of the HEP provided.   .   See EMR under Patient Instructions for exercises provided on initial eval and 2/1/2019.        Assessment     Performed NMES today to increase tendon glide of the FPL. Pt responded well and demo'd improved composite flexion following session.    Lety is progressing well towards her goals and there are no updates to goals at this time. Pt prognosis is Good.     Pt will continue to benefit from skilled outpatient occupational therapy to address the deficits listed in the problem list on initial evaluation provide pt/family education and to maximize pt's level of independence in the home and community environment.     Anticipated barriers to occupational therapy: work travelling conflicts    Pt's spiritual, cultural and educational needs considered and pt agreeable to plan of care and goals.    Goals:  STG1: [place] with HEP (1 week)-Met  STG2: Decrease edema to mild levels (2 weeks)-Progressing  STG3: Increase finger flexion to DPC (2 weeks)-Progressing  STG4: Increase wrist flex/ext ROM to 75% WNL (4 weeks)-Progressing     LTG1: Increase wrist/elbow/finger AROM to WNL (discharge)  LTG2: Return to previous activities safely and independently (discharge)    Plan   Continue OT 2 times weekly for 6 weeks (1/31/19-3/8/19)  Updates/Grading for next session: Discussed increasing frequency to 3x/wk if able      Brooklynn Whelan OT

## 2019-02-21 ENCOUNTER — OFFICE VISIT (OUTPATIENT)
Dept: ORTHOPEDICS | Facility: CLINIC | Age: 54
End: 2019-02-21
Payer: COMMERCIAL

## 2019-02-21 ENCOUNTER — HOSPITAL ENCOUNTER (OUTPATIENT)
Dept: RADIOLOGY | Facility: OTHER | Age: 54
Discharge: HOME | End: 2019-02-21
Attending: PHYSICIAN ASSISTANT
Payer: COMMERCIAL

## 2019-02-21 VITALS
WEIGHT: 200 LBS | SYSTOLIC BLOOD PRESSURE: 116 MMHG | HEIGHT: 66 IN | BODY MASS INDEX: 32.14 KG/M2 | DIASTOLIC BLOOD PRESSURE: 76 MMHG | HEART RATE: 59 BPM

## 2019-02-21 DIAGNOSIS — S52.502A CLOSED FRACTURE OF DISTAL END OF LEFT RADIUS, UNSPECIFIED FRACTURE MORPHOLOGY, INITIAL ENCOUNTER: Primary | ICD-10-CM

## 2019-02-21 DIAGNOSIS — S52.502A CLOSED FRACTURE OF DISTAL END OF LEFT RADIUS, UNSPECIFIED FRACTURE MORPHOLOGY, INITIAL ENCOUNTER: ICD-10-CM

## 2019-02-21 PROCEDURE — 99024 POSTOP FOLLOW-UP VISIT: CPT | Mod: S$GLB,,, | Performed by: PHYSICIAN ASSISTANT

## 2019-02-21 PROCEDURE — 73110 X-RAY EXAM OF WRIST: CPT | Mod: 26,LT,, | Performed by: RADIOLOGY

## 2019-02-21 PROCEDURE — 73110 XR WRIST COMPLETE 3 VIEWS LEFT: ICD-10-PCS | Mod: 26,LT,, | Performed by: RADIOLOGY

## 2019-02-21 PROCEDURE — 73110 X-RAY EXAM OF WRIST: CPT | Mod: TC,FY,LT

## 2019-02-21 PROCEDURE — 99024 PR POST-OP FOLLOW-UP VISIT: ICD-10-PCS | Mod: S$GLB,,, | Performed by: PHYSICIAN ASSISTANT

## 2019-02-21 PROCEDURE — 97760 ORTHOTIC MGMT&TRAING 1ST ENC: CPT | Mod: S$GLB,,, | Performed by: PHYSICIAN ASSISTANT

## 2019-02-21 PROCEDURE — 97760 PR ORTHOTIC MGMT&TRAINJ INITIAL ENC EA 15 MINS: ICD-10-PCS | Mod: S$GLB,,, | Performed by: PHYSICIAN ASSISTANT

## 2019-02-21 PROCEDURE — 99999 PR PBB SHADOW E&M-EST. PATIENT-LVL IV: CPT | Mod: PBBFAC,,, | Performed by: PHYSICIAN ASSISTANT

## 2019-02-21 PROCEDURE — 99999 PR PBB SHADOW E&M-EST. PATIENT-LVL IV: ICD-10-PCS | Mod: PBBFAC,,, | Performed by: PHYSICIAN ASSISTANT

## 2019-02-21 NOTE — PROGRESS NOTES
"Ms. Vogel is here today for a post-operative visit.  She is 41 days status post Open reduction and internal fixation of left four-part plus intraarticular displaced distal radius fracture by Dr. Dai on 1/11/19. She reports that she is doing well.  Pain is mild and well controlled, no current pain.  She is not taking pain medication.  She is attending OT regularly, she takes her xanax prior to OT sessions to improve her tension and anxiety with the therapy. She is using her custom brace, she says that she has been doing some light lifting at home (phone, dish, shoes) in the left hand.  She denies fever, chills, and sweats since the time of the surgery.     Physical exam:    Vitals:    02/21/19 1054   BP: 116/76   Pulse: (!) 59   Weight: 90.7 kg (200 lb)   Height: 5' 6" (1.676 m)   PainSc: 0-No pain     Vital signs are stable, patient is afebrile.  Patient is well dressed and well groomed, no acute distress.  Alert and oriented to person, place, and time.  Incision is healing well - clean, dry and intact; mild thickening.  Non-tender at incision. No residual ecchymosis. Mild edema of the fingers. There is no erythema or exudate.  There is no sign of any infection. She is NVI.  Fair finger ROM, improving.    RADIOLOGY:   Left Wrist X-Ray, 2/21/19  FINDINGS:  Interval ORIF distal radial shaft fracture with satisfactory positioning alignment of fracture fragments, metal plate and fixation screws dorsal aspect of radius.  Healing fracture ulnar styloid process.      Impression     Satisfactory postop changes radial fracture.         Assessment: 41 days status post Open reduction and internal fixation of left four-part plus intraarticular displaced distal radius fracture        Plan:  Lety was seen today for post-op evaluation.    Diagnoses and all orders for this visit:    Closed fracture of distal end of left radius, unspecified fracture morphology, initial encounter  -     X-Ray Wrist Complete Left; " Future          - Forearm brace provided (15 minutes spent preparing, fitting, and educating on brace).  - continue OT, gradually start pronation/supination  - Continue use compression glove  - Continue scar massage  - No lifting or weight bearing at this time, will begin strengthening in OT around 10 weeks PO  - Follow up in 6 weeks with X-Ray  - Call with questions or concerns

## 2019-02-22 ENCOUNTER — CLINICAL SUPPORT (OUTPATIENT)
Dept: REHABILITATION | Facility: HOSPITAL | Age: 54
End: 2019-02-22
Attending: PHYSICIAN ASSISTANT
Payer: COMMERCIAL

## 2019-02-22 DIAGNOSIS — M25.642 STIFFNESS OF LEFT HAND, NOT ELSEWHERE CLASSIFIED: ICD-10-CM

## 2019-02-22 DIAGNOSIS — M25.432 EFFUSION, LEFT WRIST: ICD-10-CM

## 2019-02-22 DIAGNOSIS — M25.442 EFFUSION, LEFT HAND: ICD-10-CM

## 2019-02-22 DIAGNOSIS — M25.632 STIFFNESS OF LEFT WRIST, NOT ELSEWHERE CLASSIFIED: ICD-10-CM

## 2019-02-22 PROCEDURE — 97032 APPL MODALITY 1+ESTIM EA 15: CPT

## 2019-02-22 PROCEDURE — 97110 THERAPEUTIC EXERCISES: CPT

## 2019-02-22 PROCEDURE — 97022 WHIRLPOOL THERAPY: CPT

## 2019-02-22 NOTE — PROGRESS NOTES
Occupational Therapy Daily Treatment Note     Date: 2/22/2019  Name: Lety Dsouzah  Clinic Number: [id number]    Therapy Diagnosis:   Encounter Diagnoses   Name Primary?    Stiffness of left wrist, not elsewhere classified     Stiffness of left hand, not elsewhere classified     Effusion, left hand     Effusion, left wrist      Physician: Esperanza Moore PA    Physician Orders: Custom orthotic- splint in supination to protect DRUJ  Eval and treat, modalities as needed  1-2x/wk for 6+ weeks  Medical Diagnosis: Closed fracture of distal end of left radius, unspecified fracture morphology, initial encounter  Surgical Procedure and Date: ORIF AMIRA GUERRERO, 1/11/19  Evaluation Date: 1/30/19  Insurance Authorization Period Expiration: 12/31/19  Plan of Care Certification Period: 6 weeks (1/30/19-3/13/19)  Date of Return to MD: 2/21/19     Visit # / Visits authorized: 6 / 50 (50 OT/PT combined max per year)  Time In:11:00am  Time Out: 12:10pm  Total Billable Time: 70 minutes     Precautions:  Standard and DRUJ instability- no forearm rotation, splint in supination per MD orders    Subjective   Patient reports she the brace she was given at MD office yesterday makes her sore and stiff.  she was compliant with home exercise program given last session.   Functional change: none yet    Pain: 7/10  Location: left UE      Objective     Lety received the following supervised modalities after being cleared for contradictions for 15 minutes:   Patient received fluidotherapy to increase blood flow, circulation, desensitization, sensory re-education and for pain management.     Lety received the following direct contact modalities after being cleared for contraindications for 15 minutes:  -NMES to FPL x 15', active flexion and passive extension , to increase composite thumb flexion      Lety received therapeutic exercises for 35 minutes including:  -AROM wrist 4 planes with and without composite fist  -AROM digits- TGES,  joint blocks, isolated FDS, table top extension  -scar massage  -A/AAROM thumb all planes  -passive finger extension, proximal to distal  -opposition with beans    Fitted patient with flexion glove and instructed her on use     Home Exercises and Education Provided     Education provided:  Flexion glove use/care. Wear 15 mins 3x/day and gradually increase duration. Stretch should not be painful to patient.       Written Home Exercises Provided: Patient instructed to cont prior HEP.  Exercises were reviewed and Lety was able to demonstrate them prior to the end of the session.  Lety demonstrated good  understanding of the HEP provided.   .   See EMR under Patient Instructions for exercises provided on initial eval and 2/1/2019.        Assessment     Pt with increased stiffness and swelling with use of prefabricated wrist brace. Advised her to bring her custom LAS to next therapy appointment and OT will cut it down into a SAS.    Lety is progressing well towards her goals and there are no updates to goals at this time. Pt prognosis is Good.     Pt will continue to benefit from skilled outpatient occupational therapy to address the deficits listed in the problem list on initial evaluation provide pt/family education and to maximize pt's level of independence in the home and community environment.     Anticipated barriers to occupational therapy: work travelling conflicts    Pt's spiritual, cultural and educational needs considered and pt agreeable to plan of care and goals.    Goals:  STG1: [place] with HEP (1 week)-Met  STG2: Decrease edema to mild levels (2 weeks)-Progressing  STG3: Increase finger flexion to DPC (2 weeks)-Progressing  STG4: Increase wrist flex/ext ROM to 75% WNL (4 weeks)-Progressing     LTG1: Increase wrist/elbow/finger AROM to WNL (discharge)  LTG2: Return to previous activities safely and independently (discharge)    Plan   Continue OT 2 times weekly for 6 weeks  (1/31/19-3/8/19)  Updates/Grading for next session: Discussed increasing frequency to 3x/wk if able. Pt will be out of town next week.      Brooklynn Whelan, OT

## 2019-03-04 ENCOUNTER — CLINICAL SUPPORT (OUTPATIENT)
Dept: REHABILITATION | Facility: HOSPITAL | Age: 54
End: 2019-03-04
Attending: PHYSICIAN ASSISTANT
Payer: COMMERCIAL

## 2019-03-04 DIAGNOSIS — M25.432 EFFUSION, LEFT WRIST: ICD-10-CM

## 2019-03-04 DIAGNOSIS — M25.442 EFFUSION, LEFT HAND: ICD-10-CM

## 2019-03-04 DIAGNOSIS — M25.642 STIFFNESS OF LEFT HAND, NOT ELSEWHERE CLASSIFIED: ICD-10-CM

## 2019-03-04 DIAGNOSIS — M25.632 STIFFNESS OF LEFT WRIST, NOT ELSEWHERE CLASSIFIED: ICD-10-CM

## 2019-03-04 PROCEDURE — 97022 WHIRLPOOL THERAPY: CPT

## 2019-03-04 PROCEDURE — 97110 THERAPEUTIC EXERCISES: CPT

## 2019-03-04 PROCEDURE — 97112 NEUROMUSCULAR REEDUCATION: CPT

## 2019-03-04 NOTE — PROGRESS NOTES
Occupational Therapy Daily Treatment Note     Date: 3/4/2019  Name: Lety Vogel  Clinic Number: [id number]    Therapy Diagnosis:   Encounter Diagnoses   Name Primary?    Stiffness of left wrist, not elsewhere classified     Stiffness of left hand, not elsewhere classified     Effusion, left hand     Effusion, left wrist      Physician: Esperanza Moore PA    Physician Orders: Custom orthotic- splint in supination to protect DRUJ  Eval and treat, modalities as needed  1-2x/wk for 6+ weeks  Medical Diagnosis: Closed fracture of distal end of left radius, unspecified fracture morphology, initial encounter  Surgical Procedure and Date: ORIF AMIRA GUERRERO, 1/11/19  Evaluation Date: 1/30/19  Insurance Authorization Period Expiration: 12/31/19  Plan of Care Certification Period: 6 weeks (1/30/19-3/13/19)  Date of Return to MD: 2/21/19     Visit # / Visits authorized: 8 / 50 (50 OT/PT combined max per year)  Time In:12:30 PM  Time Out: 1:20 PM  Total Billable Time: 50 minutes     Precautions:  Standard and DRUJ instability- no forearm rotation, splint in supination per MD orders    Subjective   Pt was compliant with home exercise program given last session. Pt is short on time today and cannot stay for entire session.  Functional change: none yet    Pain: 0/10 at rest, 2-3/10 at worst  Location: left UE      Objective     Lety received the following supervised modalities after being cleared for contradictions for 15 minutes:   Patient received fluidotherapy to increase blood flow, circulation, desensitization, sensory re-education and for pain management.     Lety received the following direct contact modalities after being cleared for contraindications for 15 minutes:  -NMES to FPL x 15', active flexion and passive extension , to increase composite thumb flexion      Lety received therapeutic exercises for 20 minutes including:  -AROM wrist 4 planes with and without composite fist, prayer stretches x 10 reps,  hold for 10 sec.  -AROM digits- TGES, joint blocks, isolated FDS, table top extension  -scar massage  -A/AAROM thumb all planes  -passive finger extension, proximal to distal  -isopheres x 3 min.        Home Exercises and Education Provided     Education provided: Prayer stretches      Written Home Exercises Provided: Patient instructed to cont prior HEP.  Exercises were reviewed and Lety was able to demonstrate them prior to the end of the session.  Lety demonstrated good  understanding of the HEP provided.   .   See EMR under Patient Instructions for exercises provided on initial eval and 2/1/2019.        Assessment     Pt tolerated advanced light functional activities with no difficulty.    Lety is progressing well towards her goals and there are no updates to goals at this time. Pt prognosis is Good.     Pt will continue to benefit from skilled outpatient occupational therapy to address the deficits listed in the problem list on initial evaluation provide pt/family education and to maximize pt's level of independence in the home and community environment.     Anticipated barriers to occupational therapy: work travelling conflicts    Pt's spiritual, cultural and educational needs considered and pt agreeable to plan of care and goals.    Goals:  STG1: [place] with HEP (1 week)-Met  STG2: Decrease edema to mild levels (2 weeks)-Progressing  STG3: Increase finger flexion to DPC (2 weeks)-Progressing  STG4: Increase wrist flex/ext ROM to 75% WNL (4 weeks)-Progressing     LTG1: Increase wrist/elbow/finger AROM to WNL (discharge)  LTG2: Return to previous activities safely and independently (discharge)    Plan: Re-assess next session   Continue OT 2 times weekly for 6 weeks (1/31/19-3/8/19)  Updates/Grading for next session: Discussed increasing frequency to 3x/wk if able. Pt will be out of town next week.      Tabatha Gomes, OT

## 2019-03-05 ENCOUNTER — PATIENT MESSAGE (OUTPATIENT)
Dept: ORTHOPEDICS | Facility: CLINIC | Age: 54
End: 2019-03-05

## 2019-03-06 ENCOUNTER — CLINICAL SUPPORT (OUTPATIENT)
Dept: REHABILITATION | Facility: HOSPITAL | Age: 54
End: 2019-03-06
Attending: PHYSICIAN ASSISTANT
Payer: COMMERCIAL

## 2019-03-06 DIAGNOSIS — M25.642 STIFFNESS OF LEFT HAND, NOT ELSEWHERE CLASSIFIED: ICD-10-CM

## 2019-03-06 DIAGNOSIS — M25.632 STIFFNESS OF LEFT WRIST, NOT ELSEWHERE CLASSIFIED: ICD-10-CM

## 2019-03-06 DIAGNOSIS — M25.442 EFFUSION, LEFT HAND: ICD-10-CM

## 2019-03-06 DIAGNOSIS — M25.432 EFFUSION, LEFT WRIST: ICD-10-CM

## 2019-03-06 PROCEDURE — 97032 APPL MODALITY 1+ESTIM EA 15: CPT

## 2019-03-06 PROCEDURE — 97110 THERAPEUTIC EXERCISES: CPT

## 2019-03-06 PROCEDURE — 97022 WHIRLPOOL THERAPY: CPT

## 2019-03-06 NOTE — PROGRESS NOTES
Occupational Therapy Daily Treatment Note     Date: 3/6/2019  Name: Lety Vogel  Clinic Number: [id number]    Therapy Diagnosis:   Encounter Diagnoses   Name Primary?    Stiffness of left wrist, not elsewhere classified     Stiffness of left hand, not elsewhere classified     Effusion, left hand     Effusion, left wrist      Physician: Esperanza Moore PA    Physician Orders: Custom orthotic- splint in supination to protect DRUJ  Eval and treat, modalities as needed  1-2x/wk for 6+ weeks  Medical Diagnosis: Closed fracture of distal end of left radius, unspecified fracture morphology, initial encounter  Surgical Procedure and Date: ORIF AMIRA GUERRERO, 1/11/19  Evaluation Date: 1/30/19  Insurance Authorization Period Expiration: 12/31/19  Plan of Care Certification Period: 6 weeks (1/30/19-3/13/19)  Date of Return to MD: 2/21/19     Visit # / Visits authorized: 9 / 50 (50 OT/PT combined max per year)  Time In:1:10 PM  Time Out: 2:15PM  Total Billable Time: 65 minutes     Precautions:  Standard and DRUJ instability- no forearm rotation, splint in supination per MD orders    7 weeks 5 days s/p    Subjective   No new complaints today  Functional change: none yet    Pain: 0/10 at rest, 2-3/10 at worst  Location: left UE      Objective     Edema (circumferential in cm):   Left (1/30/19) Left (3/6/19)   Proximal Wrist Crease 18.5 17.9   Distal [place] Crease       AROM Elbow and Wrist (in degrees)   Left (1/30/19) Left 3/6/19)   Elbow Extension/Flexion 15/135 WNL   Forearm Supination/Pronation NT 45/55   Wrist Extension/Flexion 12/10 36/12   Thumb MP and IP Flexion 35 and 12 43 and 40     Active composite finger flexion to the palm but not DPC    Lety received the following supervised modalities after being cleared for contradictions for 15 minutes:   Patient received fluidotherapy to increase blood flow, circulation, desensitization, sensory re-education and for pain management.     Lety received the following  direct contact modalities after being cleared for contraindications for 15 minutes:  -NMES to FPL x 15', active flexion and passive extension , to increase composite thumb flexion      Lety received therapeutic exercises for 35 minutes including:  -AROM wrist 4 planes with and without composite fist, prayer stretches x 10 reps, hold for 10 sec.  -AROM digits- TGES, joint blocks, isolated FDS, table top extension  -scar massage  -A/AAROM thumb all planes  -passive finger extension, proximal to distal  -isopheres x 3 min.  -passive intrinsic stretches      Home Exercises and Education Provided     Education provided: Passive intrinsic tightness      Written Home Exercises Provided: Patient instructed to cont prior HEP.  Exercises were reviewed and Lety was able to demonstrate them prior to the end of the session.  Lety demonstrated good  understanding of the HEP provided.   .   See EMR under Patient Instructions for exercises provided on initial eval and 2/1/2019.        Assessment     Pt with significant intrinsic tightness which continues to limit composite finger flexion and extension ROM. Added intrinsic stretching to HEP. Pt making overall progress with decreased edema and improved thumb and wrist AROM since initial evaluation.     Lety is progressing well towards her goals and there are no updates to goals at this time. Pt prognosis is Good.     Pt will continue to benefit from skilled outpatient occupational therapy to address the deficits listed in the problem list on initial evaluation provide pt/family education and to maximize pt's level of independence in the home and community environment.     Anticipated barriers to occupational therapy: work travelling conflicts    Pt's spiritual, cultural and educational needs considered and pt agreeable to plan of care and goals.    Goals:  STG1: [place] with HEP (1 week)-Met  STG2: Decrease edema to mild levels (2 weeks)-Progressing  STG3: Increase  finger flexion to DPC (2 weeks)-Progressing  STG4: Increase wrist flex/ext ROM to 75% WNL (4 weeks)-Progressing     LTG1: Increase wrist/elbow/finger AROM to WNL (discharge)  LTG2: Return to previous activities safely and independently (discharge)    Plan: Re-assess next session   Continue OT 2 times weekly for 6 weeks (1/31/19-3/8/19)  Updates/Grading for next session: Discussed increasing frequency to 3x/wk if able. Pt will be out of town next week.      Brooklynn Whelan, OT

## 2019-03-08 ENCOUNTER — CLINICAL SUPPORT (OUTPATIENT)
Dept: REHABILITATION | Facility: HOSPITAL | Age: 54
End: 2019-03-08
Attending: PHYSICIAN ASSISTANT
Payer: COMMERCIAL

## 2019-03-08 DIAGNOSIS — M25.642 STIFFNESS OF LEFT HAND, NOT ELSEWHERE CLASSIFIED: ICD-10-CM

## 2019-03-08 DIAGNOSIS — M25.432 EFFUSION, LEFT WRIST: ICD-10-CM

## 2019-03-08 DIAGNOSIS — M25.632 STIFFNESS OF LEFT WRIST, NOT ELSEWHERE CLASSIFIED: ICD-10-CM

## 2019-03-08 DIAGNOSIS — M25.442 EFFUSION, LEFT HAND: ICD-10-CM

## 2019-03-08 PROCEDURE — 97032 APPL MODALITY 1+ESTIM EA 15: CPT

## 2019-03-08 PROCEDURE — 97022 WHIRLPOOL THERAPY: CPT

## 2019-03-08 PROCEDURE — 97110 THERAPEUTIC EXERCISES: CPT

## 2019-03-08 NOTE — PROGRESS NOTES
Occupational Therapy Daily Treatment Note     Date: 3/8/2019  Name: Lety Dsouzah  Clinic Number: [id number]    Therapy Diagnosis:   Encounter Diagnoses   Name Primary?    Stiffness of left wrist, not elsewhere classified     Stiffness of left hand, not elsewhere classified     Effusion, left hand     Effusion, left wrist      Physician: Esperanza Moore PA    Physician Orders: Custom orthotic- splint in supination to protect DRUJ  Eval and treat, modalities as needed  1-2x/wk for 6+ weeks  Medical Diagnosis: Closed fracture of distal end of left radius, unspecified fracture morphology, initial encounter  Surgical Procedure and Date: ORIF AMIRA GUERRERO, 1/11/19  Evaluation Date: 1/30/19  Insurance Authorization Period Expiration: 12/31/19  Plan of Care Certification Period: 6 weeks (1/30/19-3/13/19)  Date of Return to MD: 4/8/19     Visit # / Visits authorized: 10 / 50 (50 OT/PT combined max per year)  Time In:3:10 PM  Time Out: 4pmPM  Total Billable Time: 50 minutes     Precautions:  Standard and DRUJ instability- no forearm rotation, splint in supination per MD orders    7 weeks 5 days s/p    Subjective   No new complaints today  Functional change: none yet    Pain: 0/10 at rest, 2-3/10 at worst  Location: left UE      Objective     Edema (circumferential in cm):   Left (1/30/19) Left (3/6/19)   Proximal Wrist Crease 18.5 17.9   Distal [place] Crease       AROM Elbow and Wrist (in degrees)   Left (1/30/19) Left 3/6/19)   Elbow Extension/Flexion 15/135 WNL   Forearm Supination/Pronation NT 45/55   Wrist Extension/Flexion 12/10 36/12   Thumb MP and IP Flexion 35 and 12 43 and 40     Active composite finger flexion to the palm but not DPC    Lety received the following supervised modalities after being cleared for contradictions for 10 minutes:   Patient received fluidotherapy to increase blood flow, circulation, desensitization, sensory re-education and for pain management.     Lety received the following  direct contact modalities after being cleared for contraindications for 10 minutes:  -NMES to FPL, active flexion and passive extension , to increase composite thumb flexion      Lety received therapeutic exercises for 30 minutes including:  -AROM wrist 4 planes with and without composite fist, prayer stretches x 10 reps, hold for 10 sec.  -AROM digits- TGES, joint blocks, isolated FDS, table top extension  -scar massage  -A/AAROM thumb all planes  -passive finger extension, proximal to distal  -isopheres x 3 min.  -passive intrinsic stretches      Home Exercises and Education Provided     Education provided: Passive intrinsic stretches      Written Home Exercises Provided: Patient instructed to cont prior HEP.  Exercises were reviewed and Lety was able to demonstrate them prior to the end of the session.  Lety demonstrated good  understanding of the HEP provided.   .   See EMR under Patient Instructions for exercises provided on initial eval and 2/1/2019.        Assessment     Pt with significant intrinsic tightness which continues to limit composite finger flexion and extension ROM. Added intrinsic stretching to HEP. Pt making overall progress with decreased edema and improved thumb and wrist AROM since initial evaluation.     Lety is progressing well towards her goals and there are no updates to goals at this time. Pt prognosis is Good.     Pt will continue to benefit from skilled outpatient occupational therapy to address the deficits listed in the problem list on initial evaluation provide pt/family education and to maximize pt's level of independence in the home and community environment.     Anticipated barriers to occupational therapy: work travelling conflicts    Pt's spiritual, cultural and educational needs considered and pt agreeable to plan of care and goals.    Goals:  STG1: [place] with HEP (1 week)-Met  STG2: Decrease edema to mild levels (2 weeks)-Progressing  STG3: Increase finger  flexion to DPC (2 weeks)-Progressing  STG4: Increase wrist flex/ext ROM to 75% WNL (4 weeks)-Progressing     LTG1: Increase wrist/elbow/finger AROM to WNL (discharge)  LTG2: Return to previous activities safely and independently (discharge)    Plan:    Continue OT 2 times weekly for 6 weeks (1/31/19-3/8/19)  Updates/Grading for next session: . Pt will be out of town next week.      Brooklynn Whelan, OT

## 2019-03-08 NOTE — PLAN OF CARE
[facility] Occupational Therapy  Plan of Care Update     Date: 3/8/2019  Patient: Lety Vogel  Chart Number: [id number]  Referring Physician: Esperanza Moore PA  Therapy Diagnosis:   1. Stiffness of left wrist, not elsewhere classified     2. Stiffness of left hand, not elsewhere classified     3. Effusion, left hand     4. Effusion, left wrist         Medical Diagnosis: Closed fracture of distal end of left radius, unspecified fracture morphology, initial encounter  Physician Orders: 1-2x/wk for 6+ weeks  Evaluation Date: 3/8/2019  NEW Plan of Care Certification Date: 4/19/19  Authorization Period: 12/31/19  Date of Return to MD: 4/8/19    Visit #: 10 of 50  Time In: 3:10pm  Time Out: 4pm  Total Billable Time: 50    Precautions:  Standard      Subjective     Update:  Pain: 0/10 at rest, 2-3/10 at worst  Location: left UE        Objective     Update:     Edema (circumferential in cm):    Left (1/30/19) Left (3/6/19)   Proximal Wrist Crease 18.5 17.9   Distal [place] Crease          AROM Elbow and Wrist (in degrees)    Left (1/30/19) Left 3/6/19)   Elbow Extension/Flexion 15/135 WNL   Forearm Supination/Pronation NT 45/55   Wrist Extension/Flexion 12/10 36/12   Thumb MP and IP Flexion 35 and 12 43 and 40      Active composite finger flexion to the palm but not DPC    Assessment         Since beginning OT, pt has made good, steady progress with her OT treatments and has worked hard towards all of her OT goals as evidenced by subjective and objective improvements. Despite these improvements,she remains with deficits with ROM, edema, pain, and scar adhesions and will continue to benefit from skilled OT consisting of Fluidotherapy, manual therapy/joint mobilization, Therapeutic exercises, Scar management and Edema management to address remaining limitations and increase functional mobility.    Goals:  Pt has met 2/4 STGs and 0/2 LTGs.    Reasons for Recertification of Therapy: Decreased ROM,  Decreased functional hand use, Edema, Joint Stiffness and Scar Adhesions  New Certification Period: 3/8/2019 to 4/19/19  Recommended Treatment Plan: 2 times per week for 6 weeks:               I CERTIFY THE NEED FOR THESE SERVICES FURNISHED UNDER THIS PLAN OF TREATMENT AND WHILE UNDER MY CARE    Physician's comments: ____________________________________________________________________________________________________________________________________________    Physician's Name: ___________________________________

## 2019-03-15 ENCOUNTER — CLINICAL SUPPORT (OUTPATIENT)
Dept: REHABILITATION | Facility: HOSPITAL | Age: 54
End: 2019-03-15
Attending: PHYSICIAN ASSISTANT
Payer: COMMERCIAL

## 2019-03-15 DIAGNOSIS — M25.432 EFFUSION, LEFT WRIST: ICD-10-CM

## 2019-03-15 DIAGNOSIS — M25.442 EFFUSION, LEFT HAND: ICD-10-CM

## 2019-03-15 DIAGNOSIS — M25.642 STIFFNESS OF LEFT HAND, NOT ELSEWHERE CLASSIFIED: ICD-10-CM

## 2019-03-15 DIAGNOSIS — M25.632 STIFFNESS OF LEFT WRIST, NOT ELSEWHERE CLASSIFIED: ICD-10-CM

## 2019-03-15 PROCEDURE — 97110 THERAPEUTIC EXERCISES: CPT

## 2019-03-15 PROCEDURE — 97022 WHIRLPOOL THERAPY: CPT

## 2019-03-15 NOTE — PROGRESS NOTES
Occupational Therapy Daily Treatment Note     Date: 3/15/2019  Name: Lety Tovarogh  Clinic Number: [id number]    Therapy Diagnosis:   Encounter Diagnoses   Name Primary?    Stiffness of left wrist, not elsewhere classified     Stiffness of left hand, not elsewhere classified     Effusion, left hand     Effusion, left wrist      Physician: Esperanza Moore PA    Physician Orders: Custom orthotic- splint in supination to protect DRUJ  Eval and treat, modalities as needed  1-2x/wk for 6+ weeks  Medical Diagnosis: Closed fracture of distal end of left radius, unspecified fracture morphology, initial encounter  Surgical Procedure and Date: ORIF AMIRA GUERRERO, 1/11/19  Evaluation Date: 1/30/19  Insurance Authorization Period Expiration: 12/31/19  Plan of Care Certification Period: 6 weeks (1/30/19-3/13/19)  Date of Return to MD: 4/8/19     Visit # / Visits authorized: 11 / 50 (50 OT/PT combined max per year)  Time In:2:10 PM  Time Out: 3pmPM  Total Billable Time: 50 minutes     Precautions:  Standard and DRUJ instability- no forearm rotation, splint in supination per MD orders        Subjective     Patient reports she is using silicone scar sheets at night.  Pain: 0/10 at rest, 2-3/10 at worst  Location: left UE      Objective     Edema (circumferential in cm):   Left (1/30/19) Left (3/6/19)   Proximal Wrist Crease 18.5 17.9   Distal [place] Crease       AROM Elbow and Wrist (in degrees)   Left (1/30/19) Left 3/6/19)   Elbow Extension/Flexion 15/135 WNL   Forearm Supination/Pronation NT 45/55   Wrist Extension/Flexion 12/10 36/12   Thumb MP and IP Flexion 35 and 12 43 and 40     Active composite finger flexion to the palm but not DPC    Lety received the following supervised modalities after being cleared for contradictions for 15 minutes:   Patient received fluidotherapy to increase blood flow, circulation, desensitization, sensory re-education and for pain management.     Lety received the following direct  contact modalities after being cleared for contraindications for 10 minutes:  -NMES to FPL, active flexion and passive extension , to increase composite thumb flexion Not today      Lety received therapeutic exercises for 35 minutes including:  -A/AAROM wrist 4 planes with and without composite fist, prayer stretches x 10 reps, hold for 10 sec.  -AROM digits- TGES, joint blocks, isolated FDS, table top extension  -scar massage  -A/AAROM thumb all planes  -passive finger extension, proximal to distal  -isopheres x 3 min.  -EDC glides with highlighter  -sup/pro wheel x 2'  -scrubbing table top x 2' for wrist extension      Home Exercises and Education Provided     Education provided:       Written Home Exercises Provided: Patient instructed to cont prior HEP.  Exercises were reviewed and Lety was able to demonstrate them prior to the end of the session.  Lety demonstrated good  understanding of the HEP provided.   .   See EMR under Patient Instructions for exercises provided on initial eval and 2/1/2019.        Assessment     Pt with significant intrinsic tightness which continues to limit composite finger flexion and extension ROM. Added intrinsic stretching to HEP. Pt making overall progress with decreased edema and improved thumb and wrist AROM since initial evaluation.     Lety is progressing well towards her goals and there are no updates to goals at this time. Pt prognosis is Good.     Pt will continue to benefit from skilled outpatient occupational therapy to address the deficits listed in the problem list on initial evaluation provide pt/family education and to maximize pt's level of independence in the home and community environment.     Anticipated barriers to occupational therapy: work travelling conflicts    Pt's spiritual, cultural and educational needs considered and pt agreeable to plan of care and goals.    Goals:  STG1: [place] with HEP (1 week)-Met  STG2: Decrease edema to mild  levels (2 weeks)-Progressing  STG3: Increase finger flexion to DPC (2 weeks)-Progressing  STG4: Increase wrist flex/ext ROM to 75% WNL (4 weeks)-Progressing     LTG1: Increase wrist/elbow/finger AROM to WNL (discharge)  LTG2: Return to previous activities safely and independently (discharge)    Plan:    Continue OT 2 times weekly for 6 weeks (1/31/19-3/8/19)  Updates/Grading for next session: . Pt will be out of town next week.      Brooklynn Whelan, OT

## 2019-03-22 ENCOUNTER — CLINICAL SUPPORT (OUTPATIENT)
Dept: REHABILITATION | Facility: HOSPITAL | Age: 54
End: 2019-03-22
Attending: PHYSICIAN ASSISTANT
Payer: COMMERCIAL

## 2019-03-22 DIAGNOSIS — M25.432 EFFUSION, LEFT WRIST: ICD-10-CM

## 2019-03-22 DIAGNOSIS — M25.632 STIFFNESS OF LEFT WRIST, NOT ELSEWHERE CLASSIFIED: ICD-10-CM

## 2019-03-22 DIAGNOSIS — M25.642 STIFFNESS OF LEFT HAND, NOT ELSEWHERE CLASSIFIED: ICD-10-CM

## 2019-03-22 DIAGNOSIS — M25.442 EFFUSION, LEFT HAND: ICD-10-CM

## 2019-03-22 PROCEDURE — 97022 WHIRLPOOL THERAPY: CPT

## 2019-03-22 PROCEDURE — 97014 ELECTRIC STIMULATION THERAPY: CPT

## 2019-03-22 PROCEDURE — 97110 THERAPEUTIC EXERCISES: CPT

## 2019-03-22 NOTE — PROGRESS NOTES
Occupational Therapy Daily Treatment Note     Date: 3/22/2019  Name: Lety Dsouzah  Clinic Number: [id number]    Therapy Diagnosis:   Encounter Diagnoses   Name Primary?    Stiffness of left wrist, not elsewhere classified     Stiffness of left hand, not elsewhere classified     Effusion, left hand     Effusion, left wrist      Physician: Esperanza Moore PA    Physician Orders: Custom orthotic- splint in supination to protect DRUJ  Eval and treat, modalities as needed  1-2x/wk for 6+ weeks  Medical Diagnosis: Closed fracture of distal end of left radius, unspecified fracture morphology, initial encounter  Surgical Procedure and Date: ORIF AMIRA GUERRERO, 1/11/19  Evaluation Date: 1/30/19  Insurance Authorization Period Expiration: 12/31/19  Plan of Care Certification Period: 6 weeks (1/30/19-3/13/19)  Date of Return to MD: 4/8/19     Visit # / Visits authorized: 12 / 50 (50 OT/PT combined max per year)  Time In:11:30 AM  Time Out: 12:15PM  Total Billable Time: 45 minutes     Precautions:  Standard and DRUJ instability- no forearm rotation, splint in supination per MD orders        Subjective     Patient reports she will be able to attend therapy more regularly now because 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  Pain: 0/10 at rest, 2-3/10 at worst  Location: left UE      Objective   Shortened session today due to patient arriving late to appointment.    Lety received the following supervised modalities after being cleared for contradictions for 12 minutes:   Patient received fluidotherapy to increase blood flow, circulation, desensitization, sensory re-education and for pain management.     Lety received the following direct contact modalities after being cleared for contraindications for 10 minutes:  -NMES to FPL, active flexion and passive extension , to increase composite thumb flexion       Lety received therapeutic exercises for 22 minutes including:  -PROM wrist all planes, thumb all planes,  and digits x 15'  -A/AAROM forearm sup/pro  -Wrist maze x 3'      Home Exercises and Education Provided     Education provided: Continue HEP      Written Home Exercises Provided: Patient instructed to cont prior HEP.  Exercises were reviewed and Lety was able to demonstrate them prior to the end of the session.  Lety demonstrated good  understanding of the HEP provided.   .   See EMR under Patient Instructions for exercises provided on initial eval and 2/1/2019.        Assessment     Good decrease in intrinsic tightness in the hand today; patient has been compliant with passive stretches for HEP.    Lety is progressing well towards her goals and there are no updates to goals at this time. Pt prognosis is Good.     Pt will continue to benefit from skilled outpatient occupational therapy to address the deficits listed in the problem list on initial evaluation provide pt/family education and to maximize pt's level of independence in the home and community environment.     Anticipated barriers to occupational therapy: work travelling conflicts    Pt's spiritual, cultural and educational needs considered and pt agreeable to plan of care and goals.    Goals:  STG1: [place] with HEP (1 week)-Met  STG2: Decrease edema to mild levels (2 weeks)-Progressing  STG3: Increase finger flexion to DPC (2 weeks)-Progressing  STG4: Increase wrist flex/ext ROM to 75% WNL (4 weeks)-Progressing     LTG1: Increase wrist/elbow/finger AROM to WNL (discharge)  LTG2: Return to previous activities safely and independently (discharge)    Plan:    Continue OT 2 times weekly for 6 weeks (1/31/19-3/8/19)  Updates/Grading for next session: . Pt will be out of town next week.      Brooklynn Whelan, OT

## 2019-03-25 ENCOUNTER — CLINICAL SUPPORT (OUTPATIENT)
Dept: REHABILITATION | Facility: HOSPITAL | Age: 54
End: 2019-03-25
Attending: PHYSICIAN ASSISTANT
Payer: COMMERCIAL

## 2019-03-25 DIAGNOSIS — M25.442 EFFUSION, LEFT HAND: ICD-10-CM

## 2019-03-25 DIAGNOSIS — M25.432 EFFUSION, LEFT WRIST: ICD-10-CM

## 2019-03-25 DIAGNOSIS — M25.642 STIFFNESS OF LEFT HAND, NOT ELSEWHERE CLASSIFIED: ICD-10-CM

## 2019-03-25 DIAGNOSIS — M25.632 STIFFNESS OF LEFT WRIST, NOT ELSEWHERE CLASSIFIED: ICD-10-CM

## 2019-03-25 PROCEDURE — 97140 MANUAL THERAPY 1/> REGIONS: CPT | Mod: PO

## 2019-03-25 PROCEDURE — 97110 THERAPEUTIC EXERCISES: CPT | Mod: PO

## 2019-03-25 PROCEDURE — 97022 WHIRLPOOL THERAPY: CPT | Mod: PO

## 2019-03-25 NOTE — PATIENT INSTRUCTIONS
WRIST: Extensors        Hold arm out in front of body. Elbow straight, palm down. Use opposite hand to bend wrist down until gentle stretch is felt on top of arm. Hold ___ seconds.

## 2019-03-25 NOTE — PROGRESS NOTES
Occupational Therapy Daily Treatment Note     Date: 3/25/2019  Name: Lety Vogel  Clinic Number: [id number]    Therapy Diagnosis:   Encounter Diagnoses   Name Primary?    Stiffness of left wrist, not elsewhere classified     Stiffness of left hand, not elsewhere classified     Effusion, left hand     Effusion, left wrist      Physician: Esperanza Moore PA    Physician Orders: Custom orthotic- splint in supination to protect DRUJ  Eval and treat, modalities as needed  1-2x/wk for 6+ weeks  Medical Diagnosis: Closed fracture of distal end of left radius, unspecified fracture morphology, initial encounter  Surgical Procedure and Date: ORIF AMIRA GUERRERO, 1/11/19  Evaluation Date: 1/30/19  Insurance Authorization Period Expiration: 12/31/19  Plan of Care Certification Period: 4/19/19  Date of Return to MD: 4/8/19     Visit # / Visits authorized: 13 / 50 (50 OT/PT combined max per year)  Time In:11:10 AM  Time Out: 12:10PM  Total Billable Time: 60 minutes     Precautions:  Standard         Subjective     Pt concerned that arm is still so stiff and swollen  Pain: 7/10 with composite wrist flexion  Location: left wrist      Objective       Lety received the following supervised modalities after being cleared for contradictions for 15 minutes:   Patient received fluidotherapy to increase blood flow, circulation, desensitization, sensory re-education and for pain management.      Lety received the following manual therapy techniques for 15 minutes:   -Passive wrist ROM, all planes  -Passive composite flexion of wrist and fingers  -Passive flexor compartment stretch      Lety received therapeutic exercises for 30 minutes including:  -EDC glides with highlighter x 2' to decrease intrinsic tightness  -Wrist maze x 2'  -Forearm sup/pro holds with dowel x 2'  -Yellow putty gripping x 3'  -Yellow clothespin 3 jaw isidoro pinch 2 x 15 reps  -Flexor compartment stretch with thera web 30 sec x 3  -Cone stacking with  varying wrist/elbow position      Home Exercises and Education Provided     Education provided: added flexor compartment stretches to HEP      Written Home Exercises Provided: Patient instructed to cont prior HEP.  Exercises were reviewed and Lety was able to demonstrate them prior to the end of the session.  Lety demonstrated good  understanding of the HEP provided.   .   See EMR under Patient Instructions for exercises provided on initial eval and 2/1/2019.        Assessment     Patient's chief complaint is limited composite wrist/finger flexion at this time, which prevents functional gripping during ADL and IADL activities. She demos significant soft tissue tightness at the dorsum of wrist and posterior forearm, limiting functional flexion.    Lety is progressing well towards her goals and there are no updates to goals at this time. Pt prognosis is Good.     Pt will continue to benefit from skilled outpatient occupational therapy to address the deficits listed in the problem list on initial evaluation provide pt/family education and to maximize pt's level of independence in the home and community environment.     Anticipated barriers to occupational therapy: work travelling conflicts    Pt's spiritual, cultural and educational needs considered and pt agreeable to plan of care and goals.    Goals:  STG1: [place] with HEP (1 week)-Met  STG2: Decrease edema to mild levels (2 weeks)-Progressing  STG3: Increase finger flexion to DPC (2 weeks)-Progressing  STG4: Increase wrist flex/ext ROM to 75% WNL (4 weeks)-Progressing     LTG1: Increase wrist/elbow/finger AROM to WNL (discharge)  LTG2: Return to previous activities safely and independently (discharge)    Plan:    Continue OT 2 times weekly for 6 weeks until 4/19/19  Updates/Grading for next session: . Pt will be out of town next week.      Brooklynn Whelan, OT

## 2019-03-29 ENCOUNTER — CLINICAL SUPPORT (OUTPATIENT)
Dept: REHABILITATION | Facility: HOSPITAL | Age: 54
End: 2019-03-29
Attending: PHYSICIAN ASSISTANT
Payer: COMMERCIAL

## 2019-03-29 DIAGNOSIS — M25.442 EFFUSION, LEFT HAND: ICD-10-CM

## 2019-03-29 DIAGNOSIS — M25.632 STIFFNESS OF LEFT WRIST, NOT ELSEWHERE CLASSIFIED: ICD-10-CM

## 2019-03-29 DIAGNOSIS — M25.642 STIFFNESS OF LEFT HAND, NOT ELSEWHERE CLASSIFIED: ICD-10-CM

## 2019-03-29 DIAGNOSIS — M25.432 EFFUSION, LEFT WRIST: ICD-10-CM

## 2019-03-29 PROCEDURE — 97022 WHIRLPOOL THERAPY: CPT

## 2019-03-29 PROCEDURE — 97110 THERAPEUTIC EXERCISES: CPT

## 2019-03-29 PROCEDURE — 97140 MANUAL THERAPY 1/> REGIONS: CPT

## 2019-03-30 ENCOUNTER — PATIENT MESSAGE (OUTPATIENT)
Dept: ADMINISTRATIVE | Facility: OTHER | Age: 54
End: 2019-03-30

## 2019-03-30 ENCOUNTER — PATIENT MESSAGE (OUTPATIENT)
Dept: OBSTETRICS AND GYNECOLOGY | Facility: CLINIC | Age: 54
End: 2019-03-30

## 2019-04-01 DIAGNOSIS — N95.1 MENOPAUSAL SYMPTOM: Primary | ICD-10-CM

## 2019-04-05 ENCOUNTER — TELEPHONE (OUTPATIENT)
Dept: ORTHOPEDICS | Facility: CLINIC | Age: 54
End: 2019-04-05

## 2019-04-08 ENCOUNTER — OFFICE VISIT (OUTPATIENT)
Dept: ORTHOPEDICS | Facility: CLINIC | Age: 54
End: 2019-04-08
Payer: COMMERCIAL

## 2019-04-08 ENCOUNTER — HOSPITAL ENCOUNTER (OUTPATIENT)
Dept: RADIOLOGY | Facility: OTHER | Age: 54
Discharge: HOME | End: 2019-04-08
Attending: PHYSICIAN ASSISTANT
Payer: COMMERCIAL

## 2019-04-08 VITALS
DIASTOLIC BLOOD PRESSURE: 85 MMHG | WEIGHT: 200 LBS | BODY MASS INDEX: 32.14 KG/M2 | SYSTOLIC BLOOD PRESSURE: 125 MMHG | HEART RATE: 82 BPM | HEIGHT: 66 IN

## 2019-04-08 DIAGNOSIS — S52.502A CLOSED FRACTURE OF DISTAL END OF LEFT RADIUS, UNSPECIFIED FRACTURE MORPHOLOGY, INITIAL ENCOUNTER: ICD-10-CM

## 2019-04-08 DIAGNOSIS — S52.502A CLOSED FRACTURE OF DISTAL END OF LEFT RADIUS, UNSPECIFIED FRACTURE MORPHOLOGY, INITIAL ENCOUNTER: Primary | ICD-10-CM

## 2019-04-08 DIAGNOSIS — Z47.89 ORTHOPEDIC AFTERCARE: ICD-10-CM

## 2019-04-08 PROCEDURE — 99024 POSTOP FOLLOW-UP VISIT: CPT | Mod: S$GLB,,, | Performed by: PHYSICIAN ASSISTANT

## 2019-04-08 PROCEDURE — 99024 PR POST-OP FOLLOW-UP VISIT: ICD-10-PCS | Mod: S$GLB,,, | Performed by: PHYSICIAN ASSISTANT

## 2019-04-08 PROCEDURE — 99999 PR PBB SHADOW E&M-EST. PATIENT-LVL III: CPT | Mod: PBBFAC,,, | Performed by: PHYSICIAN ASSISTANT

## 2019-04-08 PROCEDURE — 73110 X-RAY EXAM OF WRIST: CPT | Mod: 26,LT,, | Performed by: RADIOLOGY

## 2019-04-08 PROCEDURE — 73110 XR WRIST COMPLETE 3 VIEWS LEFT: ICD-10-PCS | Mod: 26,LT,, | Performed by: RADIOLOGY

## 2019-04-08 PROCEDURE — 99999 PR PBB SHADOW E&M-EST. PATIENT-LVL III: ICD-10-PCS | Mod: PBBFAC,,, | Performed by: PHYSICIAN ASSISTANT

## 2019-04-08 PROCEDURE — 73110 X-RAY EXAM OF WRIST: CPT | Mod: TC,FY,LT

## 2019-04-08 NOTE — PROGRESS NOTES
"Ms. Vogel is here today for a post-operative visit.  She is 87 days status post Open reduction and internal fixation of left four-part plus intraarticular displaced distal radius fracture by Dr. Dai on 1/11/19. She reports that she is doing well.  Pain is mild and well controlled, no current pain.  She is not taking pain medication.  She is attending OT regularly, she takes her xanax prior to OT sessions to improve her tension and anxiety with the therapy. She says that she has been doing some light lifting at home (phone, dish, shoes) in the left hand.  She denies fever, chills, and sweats since the time of the surgery.     Physical exam:    Vitals:    04/08/19 1441   BP: 125/85   Pulse: 82   Weight: 90.7 kg (200 lb)   Height: 5' 6" (1.676 m)   PainSc: 0-No pain     Vital signs are stable, patient is afebrile.  Patient is well dressed and well groomed, no acute distress.  Alert and oriented to person, place, and time.  Incision is healing well - clean, dry and intact; mild thickening.  Nontender at incision. Improved edema of the fingers. There is no erythema or exudate.  There is no sign of any infection. She is NVI.  Improved finger range of motion, she is able to make full composite fist.  Wrist range of motion is improving.    RADIOLOGY:   Left Wrist X-Ray, 4/8/19  FINDINGS:  Postoperative changes of internal fixation of the distal radius identified. Position alignment is satisfactory and unchanged as compared to the previous study      Impression     See above       Assessment: 87 days status post Open reduction and internal fixation of left four-part plus intraarticular displaced distal radius fracture      Plan:  Lety was seen today for pain.    Diagnoses and all orders for this visit:    Closed fracture of distal end of left radius, unspecified fracture morphology, initial encounter    Orthopedic aftercare           - continue OT and HEP  - Compression glove as needed  - Continue scar massage  - " Gradual strengthening  - Follow up as needed  - Call with questions or concerns

## 2019-04-12 ENCOUNTER — CLINICAL SUPPORT (OUTPATIENT)
Dept: REHABILITATION | Facility: HOSPITAL | Age: 54
End: 2019-04-12
Attending: PHYSICIAN ASSISTANT
Payer: COMMERCIAL

## 2019-04-12 DIAGNOSIS — M25.642 STIFFNESS OF LEFT HAND, NOT ELSEWHERE CLASSIFIED: ICD-10-CM

## 2019-04-12 DIAGNOSIS — M25.432 EFFUSION, LEFT WRIST: ICD-10-CM

## 2019-04-12 DIAGNOSIS — M25.632 STIFFNESS OF LEFT WRIST, NOT ELSEWHERE CLASSIFIED: ICD-10-CM

## 2019-04-12 DIAGNOSIS — M25.442 EFFUSION, LEFT HAND: ICD-10-CM

## 2019-04-12 PROCEDURE — 97022 WHIRLPOOL THERAPY: CPT

## 2019-04-12 PROCEDURE — 97110 THERAPEUTIC EXERCISES: CPT

## 2019-04-12 PROCEDURE — 97140 MANUAL THERAPY 1/> REGIONS: CPT

## 2019-04-12 NOTE — PROGRESS NOTES
Occupational Therapy Daily Treatment Note     Date: 4/12/2019  Name: Lety Vogel  Clinic Number: [id number]    Therapy Diagnosis:   Encounter Diagnoses   Name Primary?    Stiffness of left wrist, not elsewhere classified     Stiffness of left hand, not elsewhere classified     Effusion, left hand     Effusion, left wrist      Physician: Esperanza Moore PA    Physician Orders: Custom orthotic- splint in supination to protect DRUJ  Eval and treat, modalities as needed  1-2x/wk for 6+ weeks  Medical Diagnosis: Closed fracture of distal end of left radius, unspecified fracture morphology, initial encounter  Surgical Procedure and Date: ORIF AMIRA GUERRERO, 1/11/19  Evaluation Date: 1/30/19  Insurance Authorization Period Expiration: 12/31/19  Plan of Care Certification Period: 4/19/19  Date of Return to MD: 4/8/19     Visit # / Visits authorized: 15 / 50 (50 OT/PT combined max per year)  Time In:11:30 AM  Time Out: 12:30PM  Total Billable Time: 60 minutes     Precautions:  Standard     Subjective       Pain: 6/10 with composite wrist flexion  Location: left wrist      Objective       Lety received the following supervised modalities after being cleared for contradictions for 15 minutes:   Patient received fluidotherapy to increase blood flow, circulation, desensitization, sensory re-education and for pain management.      Lety received the following manual therapy techniques for 15 minutes:   -Passive wrist ROM, all planes  -Passive composite flexion of wrist and fingers  -Passive flexor compartment stretch    Lety received therapeutic exercises for 30 minutes including:  -EDC glides with highlighter x 2' to decrease intrinsic tightness  -Yellow putty  x 3'  -Yellow flexbar 4 ways x 10 reps ea.  Digi extend yellow band x 10  Home Exercises and Education Provided     Education provided: Continue HEP    Written Home Exercises Provided: Patient instructed to cont prior HEP.  Exercises were reviewed and  Lety was able to demonstrate them prior to the end of the session.  Lety demonstrated good  understanding of the HEP provided.   .   See EMR under Patient Instructions for exercises provided on initial eval and 2/1/2019.      Assessment     Pt making gradual progress with functional ROM and strength. Moderate swelling and stiffness continue to limit PLF.    Lety is progressing well towards her goals and there are no updates to goals at this time. Pt prognosis is Good.     Pt will continue to benefit from skilled outpatient occupational therapy to address the deficits listed in the problem list on initial evaluation provide pt/family education and to maximize pt's level of independence in the home and community environment.     Anticipated barriers to occupational therapy: work travelling conflicts    Pt's spiritual, cultural and educational needs considered and pt agreeable to plan of care and goals.    Goals:  STG1: [place] with HEP (1 week)-Met  STG2: Decrease edema to mild levels (2 weeks)-Progressing  STG3: Increase finger flexion to DPC (2 weeks)-Progressing  STG4: Increase wrist flex/ext ROM to 75% WNL (4 weeks)-Progressing     LTG1: Increase wrist/elbow/finger AROM to WNL (discharge)  LTG2: Return to previous activities safely and independently (discharge)    Plan: Re-assess   Continue OT 2 times weekly for 6 weeks until 4/19/19  Updates/Grading for next session: . Pt will be out of town next week.      Brooklynn Whelan, OT

## 2019-04-24 ENCOUNTER — CLINICAL SUPPORT (OUTPATIENT)
Dept: REHABILITATION | Facility: HOSPITAL | Age: 54
End: 2019-04-24
Attending: PHYSICIAN ASSISTANT
Payer: COMMERCIAL

## 2019-04-24 DIAGNOSIS — M25.632 STIFFNESS OF LEFT WRIST, NOT ELSEWHERE CLASSIFIED: ICD-10-CM

## 2019-04-24 DIAGNOSIS — M25.432 EFFUSION, LEFT WRIST: ICD-10-CM

## 2019-04-24 DIAGNOSIS — M25.442 EFFUSION, LEFT HAND: ICD-10-CM

## 2019-04-24 DIAGNOSIS — M25.642 STIFFNESS OF LEFT HAND, NOT ELSEWHERE CLASSIFIED: ICD-10-CM

## 2019-04-24 PROCEDURE — 97140 MANUAL THERAPY 1/> REGIONS: CPT

## 2019-04-24 PROCEDURE — 97022 WHIRLPOOL THERAPY: CPT

## 2019-04-24 PROCEDURE — 97110 THERAPEUTIC EXERCISES: CPT

## 2019-04-24 NOTE — PLAN OF CARE
[facility] Occupational Therapy  Plan of Care Update     Date: 4/24/2019  Patient: Lety Vogel  Chart Number: [id number]  Referring Physician: Esperanza Moore PA  Therapy Diagnosis:   1. Stiffness of left wrist, not elsewhere classified     2. Stiffness of left hand, not elsewhere classified     3. Effusion, left hand     4. Effusion, left wrist         Medical Diagnosis:Closed fracture of distal end of left radius, unspecified fracture morphology, initial encounter   Physician Orders: Custom orthotic- splint in supination to protect DRUJ  Eval and treat, modalities as needed  1-2x/wk for 6+ weeks  Evaluation Date: 4/24/2019  NEW Plan of Care Certification Date: 6/7/19  Authorization Period: 12/31/19  Date of Return to MD: N/a    Visit #: 16 of 50  Time In: 9:30AM  Time Out: 10:30AM  Total Billable Time: 60 minutes    Precautions:  Standard      Subjective     Update: Pt reports pain is 0/10 with activities.    Objective     Update:   Elbow/Wrist AROM Measured in degrees   3/12/2019 3/12/2019    Left Right   Wrist Flex/Ext 55/28 70/73   Forearm Sup/Pro 65/65 NT   Thumb IP (with MP flexion) 40 NT   Thumb IP (w/o MP flexion) 50 NT      Strength: (ROGER Dynamometer in lbs.) Average 3 trials, Position II:   Left Right    28 63     Pinch Strength (Measured in psi)   3/12/2019 3/12/2019    Left Right   Key Pinch 11 15   3pt Pinch 11 14     Assessment     Since beginning OT, pt has made good, steady progress with her OT treatments and has worked hard towards all of her OT goals as evidenced by subjective and objective improvements. Despite these improvements,she remains with deficits with wrist and thumb ROM, diffuse swelling, and  strength; she will continue to benefit from skilled OT to address remaining limitations and increase functional mobility.    Goals:  STG1: [place] with HEP (1 week)-Met  STG2: Decrease edema to mild levels (2 weeks)-Progressing  STG3: Increase finger  flexion to DPC (2 weeks)-Met index, middle, ring fingers, Progressing small finger  STG4: Increase wrist flex/ext ROM to 75% WNL (4 weeks)-Progressing     LTG1: Increase wrist/elbow/finger AROM to WNL-Progressing  LTG2: Return to previous activities safely and independently-Progressing      Reasons for Recertification of Therapy: Decreased ROM, Decreased  strength, Decreased functional hand use, Edema and Joint Stiffness  New Certification Period: 4/24/2019 to 6/7/19  Recommended Treatment Plan: 2 times per week for 6 weeks:   Other Recommendations: Fluidotherapy, Manual therapy/joint mobilizations, Modalities for pain management, Therapeutic exercises/activities., Strengthening, Edema Control and Scar Management            I CERTIFY THE NEED FOR THESE SERVICES FURNISHED UNDER THIS PLAN OF TREATMENT AND WHILE UNDER MY CARE    Physician's comments: ____________________________________________________________________________________________________________________________________________    Physician's Name: ___________________________________

## 2019-04-24 NOTE — PROGRESS NOTES
Occupational Therapy Daily Treatment Note     Date: 4/24/2019  Name: Lety Vogel  Clinic Number: [id number]    Therapy Diagnosis:   Encounter Diagnoses   Name Primary?    Stiffness of left wrist, not elsewhere classified     Stiffness of left hand, not elsewhere classified     Effusion, left hand     Effusion, left wrist      Physician: Esperanza Moore PA    Physician Orders: Custom orthotic- splint in supination to protect DRUJ  Eval and treat, modalities as needed  1-2x/wk for 6+ weeks  Medical Diagnosis: Closed fracture of distal end of left radius, unspecified fracture morphology, initial encounter  Surgical Procedure and Date: ORIF AMIRA GUERRERO, 1/11/19  Evaluation Date: 1/30/19  Insurance Authorization Period Expiration: 12/31/19  Plan of Care Certification Period: 4/19/19  Date of Return to MD: 4/8/19     Visit # / Visits authorized: 15 / 50 (50 OT/PT combined max per year)  Time In:9:30 AM  Time Out: 10:30 AM  Total Billable Time: 60 minutes     Precautions:  Standard     Subjective       Pain: 6/10 with composite wrist flexion  Location: left wrist      Objective     See POC for updated objective measures    Lety received the following supervised modalities after being cleared for contradictions for 15 minutes:   Patient received fluidotherapy to increase blood flow, circulation, desensitization, sensory re-education and for pain management.      Lety received the following manual therapy techniques for 30 minutes:   -Radiocarpal joint distraction, grade II mobilizations  -Passive wrist ROM, all planes  -Passive composite flexion of wrist and fingers  -Passive flexor compartment stretch    Lety received therapeutic exercises for 15 minutes including:  -Hand helper 1G/1Y/1R band x 2'  -Red flexbar sup/pro/ext x 15 each  -Functional pinch red clothespin with small pom poms x 2  -Dexercisor x 3'  -Pro/sup dowel holds x 2'    Home Exercises and Education Provided     Education provided:   Written  Home Exercises Provided: Patient instructed to cont prior HEP.  Exercises were reviewed and Lety was able to demonstrate them prior to the end of the session.  Lety demonstrated good  understanding of the HEP provided.   .   See EMR under Patient Instructions for exercises provided on initial eval and 2/1/2019.      Assessment     Patient has made decent gains in active wrist flexion, extension, and thumb IP flexion since last progress assessment. She still demos moderate deficits in wrist flexion and extension ROM and mild thumb flexion ROM and forearm rotation deficits.  strength is limited compared to LUE. Pt would benefit from continued skilled intervention to regain functional ROM and strength to improve functional independence. Discussed dynamic splinting options with patient. She is not interested at this time due to her current work travel schedule.     Lety is progressing well towards her goals and there are no updates to goals at this time. Pt prognosis is Good.     Pt will continue to benefit from skilled outpatient occupational therapy to address the deficits listed in the problem list on initial evaluation provide pt/family education and to maximize pt's level of independence in the home and community environment.     Anticipated barriers to occupational therapy: work travelling conflicts    Pt's spiritual, cultural and educational needs considered and pt agreeable to plan of care and goals.    Goals:  STG1: [place] with HEP (1 week)-Met  STG2: Decrease edema to mild levels (2 weeks)-Progressing  STG3: Increase finger flexion to DPC (2 weeks)-Met index/middle/ring fingers, Progressing small finger  STG4: Increase wrist flex/ext ROM to 75% WNL (4 weeks)-Progressing     LTG1: Increase wrist/elbow/finger AROM to WNL -Progressing  LTG2: Return to previous activities safely and independently-Progressing    Plan: Re-assess   Continue OT 2 times weekly for 6 weeks until  6/7/19  Updates/Grading for next session:       Brooklynn Whelan, OT

## 2019-05-03 ENCOUNTER — CLINICAL SUPPORT (OUTPATIENT)
Dept: REHABILITATION | Facility: HOSPITAL | Age: 54
End: 2019-05-03
Attending: PHYSICIAN ASSISTANT
Payer: COMMERCIAL

## 2019-05-03 DIAGNOSIS — M25.432 EFFUSION, LEFT WRIST: ICD-10-CM

## 2019-05-03 DIAGNOSIS — M25.442 EFFUSION, LEFT HAND: ICD-10-CM

## 2019-05-03 DIAGNOSIS — M25.642 STIFFNESS OF LEFT HAND, NOT ELSEWHERE CLASSIFIED: ICD-10-CM

## 2019-05-03 DIAGNOSIS — M25.632 STIFFNESS OF LEFT WRIST, NOT ELSEWHERE CLASSIFIED: ICD-10-CM

## 2019-05-03 PROCEDURE — 97022 WHIRLPOOL THERAPY: CPT

## 2019-05-03 PROCEDURE — 97140 MANUAL THERAPY 1/> REGIONS: CPT

## 2019-05-03 PROCEDURE — 97110 THERAPEUTIC EXERCISES: CPT

## 2019-05-03 NOTE — PROGRESS NOTES
"  Occupational Therapy Daily Treatment Note     Date: 5/3/2019  Name: Lety Vogel  Clinic Number: [id number]    Therapy Diagnosis:   Encounter Diagnoses   Name Primary?    Stiffness of left wrist, not elsewhere classified     Stiffness of left hand, not elsewhere classified     Effusion, left hand     Effusion, left wrist      Physician: Esperanza Moore PA    Physician Orders: Custom orthotic- splint in supination to protect DRUJ  Eval and treat, modalities as needed  1-2x/wk for 6+ weeks  Medical Diagnosis: Closed fracture of distal end of left radius, unspecified fracture morphology, initial encounter  Surgical Procedure and Date: ORIF AMIRA GUERRERO, 1/11/19  Evaluation Date: 1/30/19  Insurance Authorization Period Expiration: 12/31/19  Plan of Care Certification Period: 4/19/19  Date of Return to MD: 4/8/19     Visit # / Visits authorized: 16 / 50 (50 OT/PT combined max per year)  Time In:11:00 AM  Time Out: 12:00 PM  Total Billable Time: 60 minutes     Precautions:  Standard     Subjective     Pt reports compliance with HEP. "It still gets so tight wihen I try to bend my wrist and fingers."     Pain: 6/10 with composite wrist flexion  Location: left wrist      Objective     See POC for updated objective measures    Lety received the following supervised modalities after being cleared for contradictions for 15 minutes:   Patient received fluidotherapy to increase blood flow, circulation, desensitization, sensory re-education and for pain management.      Lety received the following manual therapy techniques for 30 minutes:   -Radiocarpal joint distraction, grade II mobilizations  -Passive wrist ROM, all planes  -Passive composite flexion of wrist and fingers  -Passive flexor compartment stretch    Lety received therapeutic exercises for 15 minutes including:  -Hand helper 1G/1Y/1R band x 2'  -Red flexbar sup/pro/ext x 15 each  -Functional pinch red clothespin with small pom poms x 2  -Dowel drags " through pink putty x 3'  -Weighted sup/pro dowel 2# x 2'    Home Exercises and Education Provided     Education provided:   Written Home Exercises Provided: Patient instructed to cont prior HEP.  Exercises were reviewed and Lety was able to demonstrate them prior to the end of the session.  Lety demonstrated good  understanding of the HEP provided.   .   See EMR under Patient Instructions for exercises provided on initial eval and 2/1/2019.      Assessment     Patient has made decent gains in active wrist flexion, extension, and thumb IP flexion since last progress assessment. She still demos moderate deficits in wrist flexion and extension ROM and mild thumb flexion ROM and forearm rotation deficits.  strength is limited compared to LUE. Pt would benefit from continued skilled intervention to regain functional ROM and strength to improve functional independence. Discussed dynamic splinting options with patient. She is not interested at this time due to her current work travel schedule.     Lety is progressing well towards her goals and there are no updates to goals at this time. Pt prognosis is Good.     Pt will continue to benefit from skilled outpatient occupational therapy to address the deficits listed in the problem list on initial evaluation provide pt/family education and to maximize pt's level of independence in the home and community environment.     Anticipated barriers to occupational therapy: work travelling conflicts    Pt's spiritual, cultural and educational needs considered and pt agreeable to plan of care and goals.    Goals:  STG1: [place] with HEP (1 week)-Met  STG2: Decrease edema to mild levels (2 weeks)-Progressing  STG3: Increase finger flexion to DPC (2 weeks)-Met index/middle/ring fingers, Progressing small finger  STG4: Increase wrist flex/ext ROM to 75% WNL (4 weeks)-Progressing     LTG1: Increase wrist/elbow/finger AROM to WNL -Progressing  LTG2: Return to previous  activities safely and independently-Progressing    Plan: Re-assess   Continue OT 2 times weekly for 6 weeks until 6/7/19  Updates/Grading for next session:       Brooklynn Whelan, OT

## 2019-05-07 DIAGNOSIS — Z12.11 ENCOUNTER FOR COLONOSCOPY DUE TO HISTORY OF COLONIC POLYP: Primary | ICD-10-CM

## 2019-05-07 DIAGNOSIS — Z86.010 ENCOUNTER FOR COLONOSCOPY DUE TO HISTORY OF COLONIC POLYP: Primary | ICD-10-CM

## 2019-05-07 DIAGNOSIS — Z80.0 FAMILY HISTORY OF COLON CANCER REQUIRING SCREENING COLONOSCOPY: ICD-10-CM

## 2019-05-07 RX ORDER — SODIUM, POTASSIUM,MAG SULFATES 17.5-3.13G
1 SOLUTION, RECONSTITUTED, ORAL ORAL DAILY
Qty: 1 KIT | Refills: 0 | Status: SHIPPED | OUTPATIENT
Start: 2019-05-07 | End: 2019-05-09

## 2019-05-10 ENCOUNTER — CLINICAL SUPPORT (OUTPATIENT)
Dept: REHABILITATION | Facility: HOSPITAL | Age: 54
End: 2019-05-10
Attending: INTERNAL MEDICINE
Payer: COMMERCIAL

## 2019-05-10 DIAGNOSIS — M25.442 EFFUSION, LEFT HAND: ICD-10-CM

## 2019-05-10 DIAGNOSIS — M25.632 STIFFNESS OF LEFT WRIST, NOT ELSEWHERE CLASSIFIED: ICD-10-CM

## 2019-05-10 DIAGNOSIS — M25.432 EFFUSION, LEFT WRIST: ICD-10-CM

## 2019-05-10 DIAGNOSIS — M25.642 STIFFNESS OF LEFT HAND, NOT ELSEWHERE CLASSIFIED: ICD-10-CM

## 2019-05-10 PROCEDURE — 97022 WHIRLPOOL THERAPY: CPT

## 2019-05-10 PROCEDURE — 97110 THERAPEUTIC EXERCISES: CPT

## 2019-05-10 PROCEDURE — 97140 MANUAL THERAPY 1/> REGIONS: CPT

## 2019-05-10 NOTE — PROGRESS NOTES
"  Occupational Therapy Daily Treatment Note     Date: 5/10/2019  Name: Lety Vogel  Clinic Number: [id number]    Therapy Diagnosis:   Encounter Diagnoses   Name Primary?    Stiffness of left wrist, not elsewhere classified     Stiffness of left hand, not elsewhere classified     Effusion, left hand     Effusion, left wrist      Physician: Esperanza Moore PA    Physician Orders: Custom orthotic- splint in supination to protect DRUJ  Eval and treat, modalities as needed  1-2x/wk for 6+ weeks  Medical Diagnosis: Closed fracture of distal end of left radius, unspecified fracture morphology, initial encounter  Surgical Procedure and Date: ORIF AMIRA GUERRERO, 1/11/19  Evaluation Date: 1/30/19  Insurance Authorization Period Expiration: 12/31/19  Plan of Care Certification Period: 4/19/19  Date of Return to MD: 4/8/19     Visit # / Visits authorized: 17 / 50 (50 OT/PT combined max per year)  Time In:11:00 AM  Time Out: 12:00 PM  Total Billable Time: 60 minutes     Precautions:  Standard     Subjective     Pt reports compliance with HEP. "It still gets so tight wihen I try to bend my wrist and fingers."     Pain: 6/10 with composite wrist flexion  Location: left wrist      Objective     See POC for updated objective measures    Lety received the following supervised modalities after being cleared for contradictions for 15 minutes:   Patient received fluidotherapy to increase blood flow, circulation, desensitization, sensory re-education and for pain management.      Lety received the following manual therapy techniques for 30 minutes:   -Radiocarpal joint distraction, grade II mobilizations  -Passive wrist ROM, all planes  -Passive composite flexion of wrist and fingers  -Passive flexor compartment stretch    Lety received therapeutic exercises for 15 minutes including:  -Hand helper 1G/1Y/1R band x 2'  -Digi extend green band x 30  -Functional pinch red clothespin with small pom poms x 2  -Dowel drags through " pink putty x 3'  -Weighted sup/pro dowel 2# x 2'    Home Exercises and Education Provided     Education provided:   Written Home Exercises Provided: Patient instructed to cont prior HEP.  Exercises were reviewed and Lety was able to demonstrate them prior to the end of the session.  Lety demonstrated good  understanding of the HEP provided.   .   See EMR under Patient Instructions for exercises provided on initial eval and 2/1/2019.      Assessment     Edema of hand and wrist persists.  strength is limited compared to LUE. Pt would benefit from continued skilled intervention to regain functional ROM and strength to improve functional independence. Discussed dynamic splinting options with patient. She is not interested at this time due to her current work travel schedule.     Lety is progressing well towards her goals and there are no updates to goals at this time. Pt prognosis is Good.     Pt will continue to benefit from skilled outpatient occupational therapy to address the deficits listed in the problem list on initial evaluation provide pt/family education and to maximize pt's level of independence in the home and community environment.     Anticipated barriers to occupational therapy: work travelling conflicts    Pt's spiritual, cultural and educational needs considered and pt agreeable to plan of care and goals.    Goals:  STG1: [place] with HEP (1 week)-Met  STG2: Decrease edema to mild levels (2 weeks)-Progressing  STG3: Increase finger flexion to DPC (2 weeks)-Met index/middle/ring fingers, Progressing small finger  STG4: Increase wrist flex/ext ROM to 75% WNL (4 weeks)-Progressing     LTG1: Increase wrist/elbow/finger AROM to WNL -Progressing  LTG2: Return to previous activities safely and independently-Progressing    Plan: Re-assess   Continue OT 2 times weekly for 6 weeks until 6/7/19  Updates/Grading for next session:       Brooklynn Whelan OT

## 2019-05-17 ENCOUNTER — CLINICAL SUPPORT (OUTPATIENT)
Dept: REHABILITATION | Facility: HOSPITAL | Age: 54
End: 2019-05-17
Attending: PHYSICIAN ASSISTANT
Payer: COMMERCIAL

## 2019-05-17 DIAGNOSIS — M25.442 EFFUSION, LEFT HAND: ICD-10-CM

## 2019-05-17 DIAGNOSIS — M25.632 STIFFNESS OF LEFT WRIST, NOT ELSEWHERE CLASSIFIED: ICD-10-CM

## 2019-05-17 DIAGNOSIS — M25.642 STIFFNESS OF LEFT HAND, NOT ELSEWHERE CLASSIFIED: ICD-10-CM

## 2019-05-17 DIAGNOSIS — M25.432 EFFUSION, LEFT WRIST: ICD-10-CM

## 2019-05-17 PROCEDURE — 97140 MANUAL THERAPY 1/> REGIONS: CPT

## 2019-05-17 PROCEDURE — 97110 THERAPEUTIC EXERCISES: CPT

## 2019-05-17 PROCEDURE — 97022 WHIRLPOOL THERAPY: CPT

## 2019-05-17 NOTE — PROGRESS NOTES
"  Occupational Therapy Daily Treatment Note     Date: 5/17/2019  Name: Lety Vogel  Clinic Number: [id number]    Therapy Diagnosis:   Encounter Diagnoses   Name Primary?    Stiffness of left wrist, not elsewhere classified     Stiffness of left hand, not elsewhere classified     Effusion, left hand     Effusion, left wrist      Physician: Esperanza Moore PA    Physician Orders: Custom orthotic- splint in supination to protect DRUJ  Eval and treat, modalities as needed  1-2x/wk for 6+ weeks  Medical Diagnosis: Closed fracture of distal end of left radius, unspecified fracture morphology, initial encounter  Surgical Procedure and Date: ORIF AMIRA GUERRERO, 1/11/19  Evaluation Date: 1/30/19  Insurance Authorization Period Expiration: 12/31/19  Plan of Care Certification Period: 4/19/19  Date of Return to MD: 4/8/19     Visit # / Visits authorized: 19 / 50 (50 OT/PT combined max per year)  Time In:11:05 AM  Time Out: 12:05 PM  Total Billable Time: 60 minutes     Precautions:  Standard     Subjective     Pt reports compliance with HEP. "It still gets so tight wihen I try to bend my wrist and fingers."     Pain: 6/10 with composite wrist flexion  Location: left wrist      Objective     Thumb and Wrist AROM Measured in degrees   Left Right   Thumb: MP WNL NT                 IP 58 NT   Wrist Ext/Flex 62/35 72/64   Forearm Sup/Pro 80/WNL NT      Strength: (ROGER Dynamometer in lbs.) Average 3 trials, Position II:   Left Right    39 66     Pinch Strength (Measured in psi)   Left Right   Key Pinch 13 16     Lety received the following supervised modalities after being cleared for contradictions for 15 minutes:   Patient received fluidotherapy to increase blood flow, circulation, desensitization, sensory re-education and for pain management.      Lety received the following manual therapy techniques for 30 minutes:   -Radiocarpal joint distraction, grade II mobilizations  -Passive wrist ROM, all planes  -Passive " composite flexion of wrist and fingers  -Passive flexor compartment stretch    Lety received therapeutic exercises for 15 minutes including:  -Hand helper 1G/1Y/1R band x 2'  -Digi extend green band x 30  -Functional pinch red clothespin with small pom poms x 2  -Dowel drags through pink putty x 3'  -Weighted sup/pro dowel 2# x 2'    Home Exercises and Education Provided     Education provided:   Written Home Exercises Provided: Patient instructed to cont prior HEP.  Exercises were reviewed and Lety was able to demonstrate them prior to the end of the session.  Lety demonstrated good  understanding of the HEP provided.   .   See EMR under Patient Instructions for exercises provided on initial eval and 2/1/2019.      Assessment     Patient demos increased wrist flexion and extension AROM, thumb IP flexion ROM, forearm supination ROM, and  strength since last progress assessment. She continues to demo  Intermittent mild edema,  weakness, and wrist flexion ROM deficits; however, she is able to perform daily activities independently. She would benefit from 1 more session with discharge anticipated at that time.    Lety is progressing well towards her goals and there are no updates to goals at this time. Pt prognosis is Good.     Pt will continue to benefit from skilled outpatient occupational therapy to address the deficits listed in the problem list on initial evaluation provide pt/family education and to maximize pt's level of independence in the home and community environment.     Anticipated barriers to occupational therapy: work travelling conflicts    Pt's spiritual, cultural and educational needs considered and pt agreeable to plan of care and goals.    Goals:  STG1: [place] with HEP (1 week)-Met  STG2: Decrease edema to mild levels (2 weeks)-Progressing  STG3: Increase finger flexion to DPC (2 weeks)-Met index/middle/ring fingers, Progressing small finger  STG4: Increase wrist flex/ext  ROM to 75% WNL (4 weeks)-Met     LTG1: Increase wrist/elbow/finger AROM to WNL -Progressing  LTG2: Return to previous activities safely and independently-Met    Plan: Re-assess   Continue OT 2 times weekly for 6 weeks until 6/7/19  Updates/Grading for next session:       Brooklynn Whelan, OT

## 2019-05-31 ENCOUNTER — CLINICAL SUPPORT (OUTPATIENT)
Dept: REHABILITATION | Facility: HOSPITAL | Age: 54
End: 2019-05-31
Attending: PHYSICIAN ASSISTANT
Payer: COMMERCIAL

## 2019-05-31 DIAGNOSIS — M25.442 EFFUSION, LEFT HAND: ICD-10-CM

## 2019-05-31 DIAGNOSIS — M25.632 STIFFNESS OF LEFT WRIST, NOT ELSEWHERE CLASSIFIED: ICD-10-CM

## 2019-05-31 DIAGNOSIS — M25.642 STIFFNESS OF LEFT HAND, NOT ELSEWHERE CLASSIFIED: ICD-10-CM

## 2019-05-31 DIAGNOSIS — M25.432 EFFUSION, LEFT WRIST: ICD-10-CM

## 2019-05-31 PROCEDURE — 97110 THERAPEUTIC EXERCISES: CPT

## 2019-05-31 PROCEDURE — 97022 WHIRLPOOL THERAPY: CPT

## 2019-05-31 NOTE — PLAN OF CARE
Outpatient Therapy Discharge Summary     Name: Lety Vogel  Clinic Number: [id number]    Therapy Diagnosis:   Encounter Diagnoses   Name Primary?    Stiffness of left wrist, not elsewhere classified     Stiffness of left hand, not elsewhere classified     Effusion, left hand     Effusion, left wrist      Physician: Esperanza Moore PA    Physician Orders: Custom orthotic- splint in supination to protect DRUJ  Eval and treat, modalities as needed  1-2x/wk for 6+ weeks  Medical Diagnosis: Closed fracture of distal end of left radius, unspecified fracture morphology, initial encounter  Surgical Procedure and Date: ORIF AMIRA GUERRERO, 1/11/19  Evaluation Date: 1/30/19      Date of Last visit: 5/31/19  Total Visits Received: 20    OBJECTIVE:    Thumb and Wrist AROM Measured in degrees   Left Right   Thumb: MP WNL NT                 IP 58 NT   Wrist Ext/Flex 62/35 72/64   Forearm Sup/Pro 80/WNL NT      Strength: (ROGER Dynamometer in lbs.) Average 3 trials, Position II:   Left Right    39 66     Pinch Strength (Measured in psi)   Left Right   Key Pinch 13 16       CMS Impairment/Limitation/Restriction for FOTO Wrist Survey    Therapist reviewed FOTO scores for Lety Vogel on 5/31/2019.   FOTO documents entered into [facility] - see Media section.    Limitation Score: 26%  Category: Carrying    Current : 26%  Goal: 37%  Discharge: 26%       Assessment      Patient bharathi increased wrist flexion and extension AROM, thumb IP flexion ROM, forearm supination ROM, and  strength since last progress assessment. She reports minimal pain and independence with all daily ADLs and IADL.       Goals: STG1: [place] with HEP (1 week)-Met  STG2: Decrease edema to mild levels (2 weeks)-Met  STG3: Increase finger flexion to DPC (2 weeks)-Met   STG4: Increase wrist flex/ext ROM to 75% WNL (4 weeks)-Met     LTG1: Increase wrist/elbow/finger AROM to WNL -85% Met  LTG2: Return to previous activities safely and  independently-Met    Discharge reason: Patient has met all of his/her goals    Plan   This patient is discharged from Occupational Therapy

## 2019-05-31 NOTE — PROGRESS NOTES
Occupational Therapy Daily Treatment Note     Date: 5/31/2019  Name: Lety Tovarogh  Clinic Number: [id number]    Therapy Diagnosis:   Encounter Diagnoses   Name Primary?    Stiffness of left wrist, not elsewhere classified     Stiffness of left hand, not elsewhere classified     Effusion, left hand     Effusion, left wrist      Physician: Esperanza Moore PA    Physician Orders: Custom orthotic- splint in supination to protect DRUJ  Eval and treat, modalities as needed  1-2x/wk for 6+ weeks  Medical Diagnosis: Closed fracture of distal end of left radius, unspecified fracture morphology, initial encounter  Surgical Procedure and Date: ORIF AMIRA GUERRERO, 1/11/19  Evaluation Date: 1/30/19  Insurance Authorization Period Expiration: 12/31/19  Plan of Care Certification Period: 4/19/19  Date of Return to MD: 4/8/19     Visit # / Visits authorized: 20 / 50 (50 OT/PT combined max per year)  Time In:11:10 AM  Time Out: 12:05 PM  Total Billable Time: 55 minutes     Precautions:  Standard     Subjective     Patient reports she can perform all of her daily ADLs and IADLs independently and without increased pain.    Pain: 4/10 with composite wrist flexion  Location: left wrist      Objective       Lety received the following supervised modalities after being cleared for contradictions for 15 minutes:   Patient received fluidotherapy to increase blood flow, circulation, desensitization, sensory re-education and for pain management.      Lety received therapeutic exercises for 45 minutes including:  -Passive wrist flex/ext and forearm sup/pro  -Passive composite thumb flexion  -Red flexbar sup/pro/ext x 10 each  -pink putty gripping x 3'  -green clothespin with small pom poms x 2  -thumb IP joint blocking x 10  -composite flexor/extensor compartment stretches with red power web 2 x 30 sec each    Home Exercises and Education Provided     Education provided:   Written Home Exercises Provided: Patient instructed to cont  prior HEP.  Exercises were reviewed and Lety was able to demonstrate them prior to the end of the session.  Lety demonstrated good  understanding of the HEP provided.   .   See EMR under Patient Instructions for exercises provided on initial eval and 2/1/2019.      Assessment     Patient bharathi increased wrist flexion and extension AROM, thumb IP flexion ROM, forearm supination ROM, and  strength since last progress assessment. She reports minimal pain and independence with all daily ADLs and IADL.       Pt's spiritual, cultural and educational needs considered and pt agreeable to plan of care and goals.    Goals:  STG1: [place] with HEP (1 week)-Met  STG2: Decrease edema to mild levels (2 weeks)-Met  STG3: Increase finger flexion to DPC (2 weeks)-Met   STG4: Increase wrist flex/ext ROM to 75% WNL (4 weeks)-Met     LTG1: Increase wrist/elbow/finger AROM to WNL -85% Met  LTG2: Return to previous activities safely and independently-Met    Plan:   Discharge to HEP  Updates/Grading for next session:       Brooklynn Whelan, OT

## 2019-06-10 ENCOUNTER — ANESTHESIA EVENT (OUTPATIENT)
Dept: ENDOSCOPY | Facility: HOSPITAL | Age: 54
End: 2019-06-10
Payer: COMMERCIAL

## 2019-06-10 ENCOUNTER — ANESTHESIA (OUTPATIENT)
Dept: ENDOSCOPY | Facility: HOSPITAL | Age: 54
End: 2019-06-10
Payer: COMMERCIAL

## 2019-06-10 ENCOUNTER — HOSPITAL ENCOUNTER (OUTPATIENT)
Facility: HOSPITAL | Age: 54
Discharge: HOME | End: 2019-06-10
Attending: COLON & RECTAL SURGERY | Admitting: COLON & RECTAL SURGERY
Payer: COMMERCIAL

## 2019-06-10 VITALS
DIASTOLIC BLOOD PRESSURE: 82 MMHG | BODY MASS INDEX: 33.74 KG/M2 | SYSTOLIC BLOOD PRESSURE: 140 MMHG | HEIGHT: 67 IN | WEIGHT: 215 LBS | RESPIRATION RATE: 14 BRPM | OXYGEN SATURATION: 100 % | TEMPERATURE: 98 F | HEART RATE: 72 BPM

## 2019-06-10 DIAGNOSIS — Z80.0 FAMILY HISTORY OF COLON CANCER: Primary | ICD-10-CM

## 2019-06-10 LAB
B-HCG UR QL: NEGATIVE
CTP QC/QA: YES

## 2019-06-10 PROCEDURE — 63600175 PHARM REV CODE 636 W HCPCS: Performed by: NURSE ANESTHETIST, CERTIFIED REGISTERED

## 2019-06-10 PROCEDURE — E9220 PRA ENDO ANESTHESIA: ICD-10-PCS | Mod: ,,, | Performed by: NURSE ANESTHETIST, CERTIFIED REGISTERED

## 2019-06-10 PROCEDURE — 25000003 PHARM REV CODE 250: Performed by: COLON & RECTAL SURGERY

## 2019-06-10 PROCEDURE — 81025 URINE PREGNANCY TEST: CPT | Performed by: COLON & RECTAL SURGERY

## 2019-06-10 PROCEDURE — 37000009 HC ANESTHESIA EA ADD 15 MINS: Performed by: COLON & RECTAL SURGERY

## 2019-06-10 PROCEDURE — 37000008 HC ANESTHESIA 1ST 15 MINUTES: Performed by: COLON & RECTAL SURGERY

## 2019-06-10 PROCEDURE — G0121 COLON CA SCRN NOT HI RSK IND: HCPCS | Mod: ,,, | Performed by: COLON & RECTAL SURGERY

## 2019-06-10 PROCEDURE — G0121 COLON CA SCRN NOT HI RSK IND: HCPCS | Performed by: COLON & RECTAL SURGERY

## 2019-06-10 PROCEDURE — G0121 COLON CA SCRN NOT HI RSK IND: ICD-10-PCS | Mod: ,,, | Performed by: COLON & RECTAL SURGERY

## 2019-06-10 PROCEDURE — E9220 PRA ENDO ANESTHESIA: HCPCS | Mod: ,,, | Performed by: NURSE ANESTHETIST, CERTIFIED REGISTERED

## 2019-06-10 RX ORDER — PROPOFOL 10 MG/ML
INJECTION, EMULSION INTRAVENOUS CONTINUOUS PRN
Status: DISCONTINUED | OUTPATIENT
Start: 2019-06-10 | End: 2019-06-10

## 2019-06-10 RX ORDER — PROPOFOL 10 MG/ML
INJECTION, EMULSION INTRAVENOUS
Status: DISCONTINUED | OUTPATIENT
Start: 2019-06-10 | End: 2019-06-10

## 2019-06-10 RX ORDER — SODIUM CHLORIDE 9 MG/ML
INJECTION, SOLUTION INTRAVENOUS CONTINUOUS
Status: DISCONTINUED | OUTPATIENT
Start: 2019-06-10 | End: 2019-06-10 | Stop reason: HOSPADM

## 2019-06-10 RX ORDER — LIDOCAINE HCL/PF 100 MG/5ML
SYRINGE (ML) INTRAVENOUS
Status: DISCONTINUED | OUTPATIENT
Start: 2019-06-10 | End: 2019-06-10

## 2019-06-10 RX ADMIN — PROPOFOL 200 MCG/KG/MIN: 10 INJECTION, EMULSION INTRAVENOUS at 11:06

## 2019-06-10 RX ADMIN — SODIUM CHLORIDE: 0.9 INJECTION, SOLUTION INTRAVENOUS at 11:06

## 2019-06-10 RX ADMIN — PROPOFOL 80 MG: 10 INJECTION, EMULSION INTRAVENOUS at 11:06

## 2019-06-10 RX ADMIN — LIDOCAINE HYDROCHLORIDE 60 MG: 20 INJECTION, SOLUTION INTRAVENOUS at 11:06

## 2019-06-10 NOTE — H&P
COLONOSCOPY HISTORY AND PHYSICAL EXAM    Procedure : Colonoscopy      INDICATIONS: family history of colon cancer (father, age <60)    Family Hx of CRC: father    Last Colonoscopy:    Findings: normal       Past Medical History:   Diagnosis Date    Anxiety     Early menopause     GERD (gastroesophageal reflux disease)      Sedation Problems: NO  Family History   Problem Relation Age of Onset    Cancer Father         colon, heart disease    Schizophrenia Cousin     Suicide Neg Hx      Fam Hx of Sedation Problems: NO  Social History     Socioeconomic History    Marital status: Single     Spouse name: Not on file    Number of children: Not on file    Years of education: Not on file    Highest education level: Not on file   Occupational History     Employer: [employer]   Social Needs    Financial resource strain: Not on file    Food insecurity:     Worry: Not on file     Inability: Not on file    Transportation needs:     Medical: Not on file     Non-medical: Not on file   Tobacco Use    Smoking status: Former Smoker     Types: Cigarettes     Last attempt to quit: 2006     Years since quittin.9    Smokeless tobacco: Never Used   Substance and Sexual Activity    Alcohol use: Yes     Alcohol/week: 2.5 oz     Types: 5 drink(s) per week     Comment: socially    Drug use: No    Sexual activity: Not on file   Lifestyle    Physical activity:     Days per week: Not on file     Minutes per session: Not on file    Stress: Not on file   Relationships    Social connections:     Talks on phone: Not on file     Gets together: Not on file     Attends Yarsanism service: Not on file     Active member of club or organization: Not on file     Attends meetings of clubs or organizations: Not on file     Relationship status: Not on file   Other Topics Concern    Not on file   Social History Narrative    2017    She works at [employer], mining data for transplant, for the past 6 years.     She and her partner have been together for 12 years. Her partner is an artist.    Years ago, she used to enjoy fencing.    She does have dogs that she walks.    But she has a very sedentary lifestyle.       Review of Systems - Negative except   Respiratory ROS: no dyspnea  Cardiovascular ROS: no exertional chest pain  Gastrointestinal ROS: NO abdominal discomfort,  NO rectal bleeding  Musculoskeletal ROS: no muscular pain  Neurological ROS: no recent stroke    Physical Exam:  LMP 06/10/2019   Breastfeeding? No   General: no distress  Head: normocephalic  Mallampati Score   Neck: supple, symmetrical, trachea midline  Lungs:  normal respiratory effort  Heart: regular rate and rhythm  Abdomen: soft, non-tender non-distented; bowel sounds normal; no masses,  no organomegaly  Extremities: no cyanosis or edema, or clubbing    ASA:  II    PLAN  COLONOSCOPY.    SedationPlan :MAC    The details of the procedure, the possible need for biopsy or polypectomy and the potential risks including bleeding, perforation, missed polyps were discussed in detail.

## 2019-06-10 NOTE — [employer]ATION PATIENT INSTRUCTIONS
Discharge Summary/Instructions after an Endoscopic Procedure  Patient Name: Lety Vogel  Patient MRN: [id number]  Patient YOB: 1965  Monday, Rosenda 10, 2019  Ghassan Hall MD  RESTRICTIONS:  During your procedure today, you received medications for sedation.  These   medications may affect your judgment, balance and coordination.  Therefore,   for 24 hours, you have the following restrictions:   - DO NOT drive a car, operate machinery, make legal/financial decisions,   sign important papers or drink alcohol.    ACTIVITY:  Today: no heavy lifting, straining or running due to procedural   sedation/anesthesia.  The following day: return to full activity including work.  DIET:  Eat and drink normally unless instructed otherwise.     TREATMENT FOR COMMON SIDE EFFECTS:  - Mild abdominal pain, nausea, belching, bloating or excessive gas:  rest,   eat lightly and use a heating pad.  - Sore Throat: treat with throat lozenges and/or gargle with warm salt   water.  - Because air was used during the procedure, expelling large amounts of air   from your rectum or belching is normal.  - If a bowel prep was taken, you may not have a bowel movement for 1-3 days.    This is normal.  SYMPTOMS TO WATCH FOR AND REPORT TO YOUR PHYSICIAN:  1. Abdominal pain or bloating, other than gas cramps.  2. Chest pain.  3. Back pain.  4. Signs of infection such as: chills or fever occurring within 24 hours   after the procedure.  5. Rectal bleeding, which would show as bright red, maroon, or black stools.   (A tablespoon of blood from the rectum is not serious, especially if   hemorrhoids are present.)  6. Vomiting.  7. Weakness or dizziness.  GO DIRECTLY TO THE NEAREST EMERGENCY ROOM IF YOU HAVE ANY OF THE FOLLOWING:      Difficulty breathing              Chills and/or fever over 101 F   Persistent vomiting and/or vomiting blood   Severe abdominal pain   Severe chest pain   Black, tarry stools   Bleeding- more than one  tablespoon   Any other symptom or condition that you feel may need urgent attention  Your doctor recommends these additional instructions:  If any biopsies were taken, your doctors clinic will contact you in 1 to 2   weeks with any results.  - Patient has a contact number available for emergencies.  The signs and   symptoms of potential delayed complications were discussed with the   patient.  Return to normal activities tomorrow.  Written discharge   instructions were provided to the patient.   - Discharge patient to home (ambulatory).   - Resume regular diet indefinitely.   - Continue present medications.   - Repeat colonoscopy in 5 years for screening purposes.  For questions, problems or results please call your physician - Ghassan Hall MD at Work:  [phone].  [employer] [facility], EMERGENCY ROOM PHONE NUMBER: [phone]  IF A COMPLICATION OR EMERGENCY SITUATION ARISES AND YOU ARE UNABLE TO REACH   YOUR PHYSICIAN - GO DIRECTLY TO THE EMERGENCY ROOM.  Ghassan Hall MD  6/10/2019 11:30:22 AM  This report has been verified and signed electronically.  [employer]

## 2019-06-10 NOTE — ANESTHESIA PREPROCEDURE EVALUATION
06/10/2019  Lety Vogel is a 53 y.o., female.    Anesthesia Evaluation    I have reviewed the Patient Summary Reports.    I have reviewed the Nursing Notes.   I have reviewed the Medications.     Review of Systems  Anesthesia Hx:  No problems with previous Anesthesia   Denies Personal Hx of Anesthesia complications.   Social:  Former Smoker, Social Alcohol Use    Hematology/Oncology:  Hematology Normal   Oncology Normal     EENT/Dental:EENT/Dental Normal   Cardiovascular:  Cardiovascular Normal Exercise tolerance: good         Pulmonary:  Pulmonary Normal    Renal/:  Renal/ Normal     Hepatic/GI:   Bowel Prep. GERD, well controlled    Musculoskeletal:  Musculoskeletal Normal    Neurological:  Neurology Normal    Endocrine:  Endocrine Normal    Dermatological:  Skin Normal    Psych:   anxiety          Physical Exam  General:  Well nourished    Airway/Jaw/Neck:  Airway Findings: Mouth Opening: Normal Mallampati: III  Improves to II with phonation.  TM Distance: 4 - 6 cm  Jaw/Neck Findings:  Neck ROM: Normal ROM      Dental:  Dental Findings: In tact   Chest/Lungs:  Chest/Lungs Findings: Clear to auscultation, Normal Respiratory Rate     Heart/Vascular:  Heart Findings: Rate: Normal  Rhythm: Regular Rhythm  Sounds: Normal  Heart murmur: negative       Mental Status:  Mental Status Findings: Normal        Anesthesia Plan  Type of Anesthesia, risks & benefits discussed:  Anesthesia Type:  general  Patient's Preference:   Intra-op Monitoring Plan: standard ASA monitors  Intra-op Monitoring Plan Comments:   Post Op Pain Control Plan: IV/PO Opioids PRN  Post Op Pain Control Plan Comments:   Induction:   IV  Beta Blocker:  Patient is not currently on a Beta-Blocker (No further documentation required).       Informed Consent: Patient understands risks and agrees with Anesthesia plan.  Questions answered.  Anesthesia consent signed with patient.  ASA Score: 2     Day of Surgery Review of History & Physical: I have interviewed and examined the patient. I have reviewed the patient's H&P dated: 6/10/19.   H&P update referred to the provider.         Ready For Surgery From Anesthesia Perspective.

## 2019-06-10 NOTE — TRANSFER OF CARE
"Anesthesia Transfer of Care Note    Patient: Lety Vogel    Procedure(s) Performed: Procedure(s) (LRB):  COLONOSCOPY (N/A)    Patient location: GI    Anesthesia Type: general    Transport from OR: Transported from OR on room air with adequate spontaneous ventilation    Post pain: adequate analgesia    Post assessment: no apparent anesthetic complications and tolerated procedure well    Post vital signs: stable    Level of consciousness: awake    Nausea/Vomiting: no nausea/vomiting    Complications: none    Transfer of care protocol was followed      Last vitals:   Visit Vitals  BP (!) 143/79 (BP Location: Left arm, Patient Position: Lying)   Pulse 89   Temp 36.7 °C (98.1 °F) (Temporal)   Resp 16   Ht 5' 7" (1.702 m)   Wt 97.5 kg (215 lb)   LMP 06/10/2019   SpO2 98%   Breastfeeding? No   BMI 33.67 kg/m²     "

## 2019-06-10 NOTE — ANESTHESIA POSTPROCEDURE EVALUATION
Anesthesia Post Evaluation    Patient: Lety Vogel    Procedure(s) Performed: Procedure(s) (LRB):  COLONOSCOPY (N/A)    Final Anesthesia Type: general  Patient location during evaluation: PACU  Patient participation: Yes- Able to Participate  Level of consciousness: awake and alert  Post-procedure vital signs: reviewed and stable  Pain management: adequate  Airway patency: patent  PONV status at discharge: No PONV  Anesthetic complications: no      Cardiovascular status: hemodynamically stable  Respiratory status: unassisted  Hydration status: euvolemic  Follow-up not needed.          Vitals Value Taken Time   /79 6/10/2019 11:47 AM   Temp 36.7 °C (98.1 °F) 6/10/2019 11:32 AM   Pulse 80 6/10/2019 11:47 AM   Resp 14 6/10/2019 11:47 AM   SpO2 97 % 6/10/2019 11:47 AM         No case tracking events are documented in the log.      Pain/Jackie Score: Jackie Score: 10 (6/10/2019 11:48 AM)

## 2019-06-10 NOTE — DISCHARGE INSTRUCTIONS
Colonoscopy     A camera attached to a flexible tube with a viewing lens is used to take video pictures.     Colonoscopy is a test to view the inside of your lower digestive tract (colon and rectum). Sometimes it can show the last part of the small intestine (ileum). During the test, small pieces of tissue may be removed for testing. This is called a biopsy. Small growths, such as polyps, may also be removed.   Why is colonoscopy done?  The test is done to help look for colon cancer. And it can help find the source of abdominal pain, bleeding, and changes in bowel habits. It may be needed once a year, depending on factors such as your:  · Age  · Health history  · Family health history  · Symptoms  · Results from any prior colonoscopy  Risks and possible complications  These include:  · Bleeding               · A puncture or tear in the colon   · Risks of anesthesia  · A cancer lesion not being seen  Getting ready   To prepare for the test:  · Talk with your healthcare provider about the risks of the test (see below). Also ask your healthcare provider about alternatives to the test.  · Tell your healthcare provider about any medicines you take. Also tell him or her about any health conditions you may have.  · Make sure your rectum and colon are empty for the test. Follow the diet and bowel prep instructions exactly. If you dont, the test may need to be rescheduled.  · Plan for a friend or family member to drive you home after the test.     Colonoscopy provides an inside view of the entire colon.     You may discuss the results with your doctor right away or at a future visit.  During the test   The test is usually done in the hospital on an outpatient basis. This means you go home the same day. The procedure takes about 30 minutes. During that time:  · You are given relaxing (sedating) medicine through an IV line. You may be drowsy, or fully asleep.  · The healthcare provider will first give you a physical exam to  check for anal and rectal problems.  · Then the anus is lubricated and the scope inserted.  · If you are awake, you may have a feeling similar to needing to have a bowel movement. You may also feel pressure as air is pumped into the colon. Its OK to pass gas during the procedure.  · Biopsy, polyp removal, or other treatments may be done during the test.  After the test   You may have gas right after the test. It can help to try to pass it to help prevent later bloating. Your healthcare provider may discuss the results with you right away. Or you may need to schedule a follow-up visit to talk about the results. After the test, you can go back to your normal eating and other activities. You may be tired from the sedation and need to rest for a few hours.  Date Last Reviewed: 11/1/2016 © 2000-2017 [employer]. [address] [place] [address], [place] [address]. All rights reserved. This information is not intended as a substitute for professional medical care. Always follow your healthcare professional's instructions.

## 2019-06-17 ENCOUNTER — TELEPHONE (OUTPATIENT)
Dept: ENDOSCOPY | Facility: HOSPITAL | Age: 54
End: 2019-06-17

## 2020-03-18 ENCOUNTER — PATIENT MESSAGE (OUTPATIENT)
Dept: OBSTETRICS AND GYNECOLOGY | Facility: CLINIC | Age: 55
End: 2020-03-18

## 2020-03-18 DIAGNOSIS — N95.1 PERIMENOPAUSE: ICD-10-CM

## 2020-03-18 RX ORDER — ESTRADIOL 2 MG/1
TABLET ORAL
Qty: 90 TABLET | Refills: 0 | Status: SHIPPED | OUTPATIENT
Start: 2020-03-18 | End: 2020-06-16

## 2020-03-18 RX ORDER — PROGESTERONE 100 MG/1
CAPSULE ORAL
Qty: 90 CAPSULE | Refills: 0 | Status: SHIPPED | OUTPATIENT
Start: 2020-03-18 | End: 2020-06-16

## 2020-06-11 DIAGNOSIS — Z12.31 VISIT FOR SCREENING MAMMOGRAM: Primary | ICD-10-CM

## 2020-06-12 DIAGNOSIS — N95.1 MENOPAUSAL SYMPTOMS: Primary | ICD-10-CM

## 2020-06-12 DIAGNOSIS — R53.83 FATIGUE, UNSPECIFIED TYPE: ICD-10-CM

## 2020-06-24 ENCOUNTER — OFFICE VISIT (OUTPATIENT)
Dept: OBSTETRICS AND GYNECOLOGY | Facility: CLINIC | Age: 55
End: 2020-06-24
Attending: OBSTETRICS & GYNECOLOGY
Payer: COMMERCIAL

## 2020-06-24 ENCOUNTER — HOSPITAL ENCOUNTER (OUTPATIENT)
Dept: RADIOLOGY | Facility: OTHER | Age: 55
Discharge: HOME | End: 2020-06-24
Attending: OBSTETRICS & GYNECOLOGY
Payer: COMMERCIAL

## 2020-06-24 VITALS
HEIGHT: 67 IN | DIASTOLIC BLOOD PRESSURE: 72 MMHG | WEIGHT: 220.25 LBS | SYSTOLIC BLOOD PRESSURE: 110 MMHG | BODY MASS INDEX: 34.57 KG/M2

## 2020-06-24 DIAGNOSIS — Z01.419 WELL WOMAN EXAM WITH ROUTINE GYNECOLOGICAL EXAM: Primary | ICD-10-CM

## 2020-06-24 DIAGNOSIS — Z12.31 VISIT FOR SCREENING MAMMOGRAM: ICD-10-CM

## 2020-06-24 DIAGNOSIS — Z78.0 MENOPAUSE: ICD-10-CM

## 2020-06-24 DIAGNOSIS — N63.20 BREAST MASS, LEFT: ICD-10-CM

## 2020-06-24 PROCEDURE — 77067 SCR MAMMO BI INCL CAD: CPT | Mod: 26,,, | Performed by: RADIOLOGY

## 2020-06-24 PROCEDURE — 77063 MAMMO DIGITAL SCREENING BILAT WITH TOMOSYNTHESIS_CAD: ICD-10-PCS | Mod: 26,,, | Performed by: RADIOLOGY

## 2020-06-24 PROCEDURE — 77063 BREAST TOMOSYNTHESIS BI: CPT | Mod: 26,,, | Performed by: RADIOLOGY

## 2020-06-24 PROCEDURE — 77067 MAMMO DIGITAL SCREENING BILAT WITH TOMOSYNTHESIS_CAD: ICD-10-PCS | Mod: 26,,, | Performed by: RADIOLOGY

## 2020-06-24 PROCEDURE — 99396 PREV VISIT EST AGE 40-64: CPT | Mod: S$GLB,,, | Performed by: OBSTETRICS & GYNECOLOGY

## 2020-06-24 PROCEDURE — 77067 SCR MAMMO BI INCL CAD: CPT | Mod: TC

## 2020-06-24 PROCEDURE — 99396 PR PREVENTIVE VISIT,EST,40-64: ICD-10-PCS | Mod: S$GLB,,, | Performed by: OBSTETRICS & GYNECOLOGY

## 2020-06-24 NOTE — PROGRESS NOTES
Subjective:       Patient ID: Lety Vogel is a 54 y.o. female.    Chief Complaint:  No chief complaint on file.      History of Present Illness  HPI  This 54 yr old P0 female is here for WWE.  Her pap and HPV were negative in .  Her mammogram is scheduled and was neg low risk last yr.  She is taking estrace 2mg and prometrium 100 daily and doing well with this but her sister was just diagnosed with DCIS and is having a lumpectomy.  We discussed this and discussed weaning but is her decision. Otherwise she has not had a cycle in about 6 months and has no complaints    GYN & OB History  No LMP recorded.   Date of Last Pap: 2019    OB History    Para Term  AB Living   0 0 0 0 0 0   SAB TAB Ectopic Multiple Live Births   0 0 0 0         Review of Systems  Review of Systems   Constitutional: Negative for chills and fever.   Respiratory: Negative for shortness of breath.    Cardiovascular: Negative for chest pain.   Gastrointestinal: Negative for abdominal pain, nausea and vomiting.   Genitourinary: Negative for difficulty urinating, dyspareunia, genital sores, menstrual problem, pelvic pain, vaginal bleeding, vaginal discharge and vaginal pain.   Skin: Negative for wound.   Hematological: Negative for adenopathy.           Objective:   Physical Exam:   Constitutional: She is oriented to person, place, and time. She appears well-developed and well-nourished.    HENT:   Head: Normocephalic.    Eyes: EOM are normal.    Neck: Normal range of motion.    Cardiovascular: Normal rate.     Pulmonary/Chest: Effort normal. She exhibits no mass and no tenderness. Right breast exhibits no inverted nipple, no mass, no skin change and no tenderness. Left breast exhibits no inverted nipple, no mass, no skin change and no tenderness.        Abdominal: Soft. She exhibits no distension. There is no abdominal tenderness.     Genitourinary:    Vagina and uterus normal.   There is no rash, tenderness or lesion  on the right labia. There is no rash, tenderness or lesion on the left labia. Uterus is not tender. Cervix is normal. Right adnexum displays no mass, no tenderness and no fullness. Left adnexum displays no mass, no tenderness and no fullness. Cervix exhibits no discharge.           Musculoskeletal: Normal range of motion.       Neurological: She is alert and oriented to person, place, and time.    Skin: Skin is warm and dry.    Psychiatric: She has a normal mood and affect.          Assessment:        1. Well woman exam with routine gynecological exam    2. Menopause               Plan:      Mammograms yearly  Pap every 3 yrs  Continue estrace 2mg and prometrium 100 but may wean at any time.  Will send to Dr Meier for WWE and to discuss this all again if she wishes.

## 2020-06-25 ENCOUNTER — PATIENT MESSAGE (OUTPATIENT)
Dept: OBSTETRICS AND GYNECOLOGY | Facility: CLINIC | Age: 55
End: 2020-06-25

## 2020-06-25 DIAGNOSIS — Z91.89 AT HIGH RISK FOR BREAST CANCER: Primary | ICD-10-CM

## 2020-06-25 DIAGNOSIS — Z78.0 MENOPAUSE: Primary | ICD-10-CM

## 2020-06-25 RX ORDER — ESTRADIOL 1 MG/1
1 TABLET ORAL DAILY
Qty: 30 TABLET | Refills: 11 | Status: SHIPPED | OUTPATIENT
Start: 2020-06-25 | End: 2020-09-11 | Stop reason: SDUPTHER

## 2021-06-24 ENCOUNTER — TELEPHONE (OUTPATIENT)
Dept: OBSTETRICS AND GYNECOLOGY | Facility: CLINIC | Age: 56
End: 2021-06-24

## 2021-06-24 DIAGNOSIS — Z12.31 SCREENING MAMMOGRAM, ENCOUNTER FOR: Primary | ICD-10-CM

## 2021-07-14 ENCOUNTER — HOSPITAL ENCOUNTER (OUTPATIENT)
Dept: RADIOLOGY | Facility: OTHER | Age: 56
Discharge: HOME | End: 2021-07-14
Attending: OBSTETRICS & GYNECOLOGY
Payer: COMMERCIAL

## 2021-07-14 DIAGNOSIS — Z12.31 VISIT FOR SCREENING MAMMOGRAM: ICD-10-CM

## 2021-07-14 PROCEDURE — 77063 MAMMO DIGITAL SCREENING BILAT WITH TOMO: ICD-10-PCS | Mod: 26,,, | Performed by: RADIOLOGY

## 2021-07-14 PROCEDURE — 77067 MAMMO DIGITAL SCREENING BILAT WITH TOMO: ICD-10-PCS | Mod: 26,,, | Performed by: RADIOLOGY

## 2021-07-14 PROCEDURE — 77067 SCR MAMMO BI INCL CAD: CPT | Mod: TC

## 2021-07-14 PROCEDURE — 77063 BREAST TOMOSYNTHESIS BI: CPT | Mod: 26,,, | Performed by: RADIOLOGY

## 2021-07-14 PROCEDURE — 77067 SCR MAMMO BI INCL CAD: CPT | Mod: 26,,, | Performed by: RADIOLOGY

## 2021-07-28 ENCOUNTER — LAB VISIT (OUTPATIENT)
Dept: LAB | Facility: HOSPITAL | Age: 56
End: 2021-07-28
Attending: OBSTETRICS & GYNECOLOGY
Payer: COMMERCIAL

## 2021-07-28 ENCOUNTER — OFFICE VISIT (OUTPATIENT)
Dept: OBSTETRICS AND GYNECOLOGY | Facility: CLINIC | Age: 56
End: 2021-07-28
Payer: COMMERCIAL

## 2021-07-28 VITALS
WEIGHT: 215.81 LBS | DIASTOLIC BLOOD PRESSURE: 72 MMHG | HEIGHT: 67 IN | SYSTOLIC BLOOD PRESSURE: 116 MMHG | BODY MASS INDEX: 33.87 KG/M2

## 2021-07-28 DIAGNOSIS — N95.1 MENOPAUSAL SYMPTOMS: ICD-10-CM

## 2021-07-28 DIAGNOSIS — N95.1 MENOPAUSAL SYMPTOMS: Primary | ICD-10-CM

## 2021-07-28 LAB
ESTRADIOL SERPL-MCNC: 28 PG/ML
FSH SERPL-ACNC: 49.3 MIU/ML

## 2021-07-28 PROCEDURE — 99214 PR OFFICE/OUTPT VISIT, EST, LEVL IV, 30-39 MIN: ICD-10-PCS | Mod: S$GLB,,, | Performed by: OBSTETRICS & GYNECOLOGY

## 2021-07-28 PROCEDURE — 99999 PR PBB SHADOW E&M-EST. PATIENT-LVL III: CPT | Mod: PBBFAC,,, | Performed by: OBSTETRICS & GYNECOLOGY

## 2021-07-28 PROCEDURE — 99999 PR PBB SHADOW E&M-EST. PATIENT-LVL III: ICD-10-PCS | Mod: PBBFAC,,, | Performed by: OBSTETRICS & GYNECOLOGY

## 2021-07-28 PROCEDURE — 83001 ASSAY OF GONADOTROPIN (FSH): CPT | Performed by: OBSTETRICS & GYNECOLOGY

## 2021-07-28 PROCEDURE — 99214 OFFICE O/P EST MOD 30 MIN: CPT | Mod: S$GLB,,, | Performed by: OBSTETRICS & GYNECOLOGY

## 2021-07-28 PROCEDURE — 82670 ASSAY OF TOTAL ESTRADIOL: CPT | Performed by: OBSTETRICS & GYNECOLOGY

## 2021-07-28 PROCEDURE — 84402 ASSAY OF FREE TESTOSTERONE: CPT | Performed by: OBSTETRICS & GYNECOLOGY

## 2021-07-28 RX ORDER — CLOTRIMAZOLE AND BETAMETHASONE DIPROPIONATE 10; .64 MG/G; MG/G
CREAM TOPICAL
Qty: 15 G | Refills: 1 | Status: SHIPPED | OUTPATIENT
Start: 2021-07-28 | End: 2021-08-24 | Stop reason: SDUPTHER

## 2021-07-30 ENCOUNTER — TELEPHONE (OUTPATIENT)
Dept: INTERNAL MEDICINE | Facility: CLINIC | Age: 56
End: 2021-07-30

## 2021-08-02 LAB — TESTOST FREE SERPL-MCNC: 0.8 PG/ML

## 2021-08-24 ENCOUNTER — OFFICE VISIT (OUTPATIENT)
Dept: OBSTETRICS AND GYNECOLOGY | Facility: CLINIC | Age: 56
End: 2021-08-24
Attending: OBSTETRICS & GYNECOLOGY
Payer: COMMERCIAL

## 2021-08-24 VITALS
WEIGHT: 214.19 LBS | HEIGHT: 67 IN | DIASTOLIC BLOOD PRESSURE: 84 MMHG | BODY MASS INDEX: 33.62 KG/M2 | SYSTOLIC BLOOD PRESSURE: 133 MMHG

## 2021-08-24 DIAGNOSIS — Z01.419 ENCOUNTER FOR GYNECOLOGICAL EXAMINATION WITHOUT ABNORMAL FINDING: Primary | ICD-10-CM

## 2021-08-24 DIAGNOSIS — N95.1 MENOPAUSAL SYMPTOMS: ICD-10-CM

## 2021-08-24 DIAGNOSIS — Z12.4 PAP SMEAR FOR CERVICAL CANCER SCREENING: ICD-10-CM

## 2021-08-24 PROCEDURE — 99396 PREV VISIT EST AGE 40-64: CPT | Mod: S$GLB,,, | Performed by: OBSTETRICS & GYNECOLOGY

## 2021-08-24 PROCEDURE — 99396 PR PREVENTIVE VISIT,EST,40-64: ICD-10-PCS | Mod: S$GLB,,, | Performed by: OBSTETRICS & GYNECOLOGY

## 2021-08-24 PROCEDURE — 88175 CYTOPATH C/V AUTO FLUID REDO: CPT | Performed by: OBSTETRICS & GYNECOLOGY

## 2021-08-24 RX ORDER — PROGESTERONE 200 MG/1
200 CAPSULE ORAL NIGHTLY
Qty: 30 CAPSULE | Refills: 12 | Status: SHIPPED | OUTPATIENT
Start: 2021-08-24 | End: 2022-07-07

## 2021-08-24 RX ORDER — CLOTRIMAZOLE AND BETAMETHASONE DIPROPIONATE 10; .64 MG/G; MG/G
CREAM TOPICAL
Qty: 15 G | Refills: 1 | Status: SHIPPED | OUTPATIENT
Start: 2021-08-24 | End: 2022-08-24

## 2021-08-24 RX ORDER — ESTRADIOL 1 MG/1
1 TABLET ORAL DAILY
Qty: 30 TABLET | Refills: 11 | Status: SHIPPED | OUTPATIENT
Start: 2021-08-24 | End: 2021-09-16

## 2021-09-13 LAB
HPV HR 12 DNA SPEC QL NAA+PROBE: NEGATIVE
HPV16 AG SPEC QL: NEGATIVE
HPV18 DNA SPEC QL NAA+PROBE: NEGATIVE

## 2021-09-14 LAB
FINAL PATHOLOGIC DIAGNOSIS: NORMAL
Lab: NORMAL

## 2021-09-22 ENCOUNTER — OFFICE VISIT (OUTPATIENT)
Dept: INTERNAL MEDICINE | Facility: CLINIC | Age: 56
End: 2021-09-22
Payer: COMMERCIAL

## 2021-09-22 ENCOUNTER — LAB VISIT (OUTPATIENT)
Dept: LAB | Facility: HOSPITAL | Age: 56
End: 2021-09-22
Attending: INTERNAL MEDICINE
Payer: COMMERCIAL

## 2021-09-22 VITALS
OXYGEN SATURATION: 99 % | DIASTOLIC BLOOD PRESSURE: 62 MMHG | HEART RATE: 80 BPM | BODY MASS INDEX: 33.8 KG/M2 | WEIGHT: 215.38 LBS | SYSTOLIC BLOOD PRESSURE: 115 MMHG | HEIGHT: 67 IN

## 2021-09-22 DIAGNOSIS — E66.9 CLASS 1 OBESITY WITH SERIOUS COMORBIDITY AND BODY MASS INDEX (BMI) OF 33.0 TO 33.9 IN ADULT, UNSPECIFIED OBESITY TYPE: ICD-10-CM

## 2021-09-22 DIAGNOSIS — E55.9 VITAMIN D DEFICIENCY: ICD-10-CM

## 2021-09-22 DIAGNOSIS — Z00.00 ANNUAL PHYSICAL EXAM: ICD-10-CM

## 2021-09-22 DIAGNOSIS — Z11.59 ENCOUNTER FOR HEPATITIS C SCREENING TEST FOR LOW RISK PATIENT: ICD-10-CM

## 2021-09-22 DIAGNOSIS — Z91.89 AT HIGH RISK FOR BREAST CANCER: ICD-10-CM

## 2021-09-22 DIAGNOSIS — K21.9 GASTROESOPHAGEAL REFLUX DISEASE WITHOUT ESOPHAGITIS: ICD-10-CM

## 2021-09-22 DIAGNOSIS — M25.511 CHRONIC RIGHT SHOULDER PAIN: ICD-10-CM

## 2021-09-22 DIAGNOSIS — M25.371 RIGHT ANKLE INSTABILITY: ICD-10-CM

## 2021-09-22 DIAGNOSIS — G89.29 CHRONIC RIGHT SHOULDER PAIN: ICD-10-CM

## 2021-09-22 DIAGNOSIS — Z00.00 ANNUAL PHYSICAL EXAM: Primary | ICD-10-CM

## 2021-09-22 PROBLEM — E66.811 CLASS 1 OBESITY WITH SERIOUS COMORBIDITY IN ADULT: Status: ACTIVE | Noted: 2021-09-22

## 2021-09-22 LAB
ESTIMATED AVG GLUCOSE: 117 MG/DL (ref 68–131)
HBA1C MFR BLD: 5.7 % (ref 4–5.6)

## 2021-09-22 PROCEDURE — 36415 COLL VENOUS BLD VENIPUNCTURE: CPT | Performed by: INTERNAL MEDICINE

## 2021-09-22 PROCEDURE — 99396 PR PREVENTIVE VISIT,EST,40-64: ICD-10-PCS | Mod: S$GLB,,, | Performed by: INTERNAL MEDICINE

## 2021-09-22 PROCEDURE — 83036 HEMOGLOBIN GLYCOSYLATED A1C: CPT | Performed by: INTERNAL MEDICINE

## 2021-09-22 PROCEDURE — 80053 COMPREHEN METABOLIC PANEL: CPT | Performed by: INTERNAL MEDICINE

## 2021-09-22 PROCEDURE — 86803 HEPATITIS C AB TEST: CPT | Performed by: INTERNAL MEDICINE

## 2021-09-22 PROCEDURE — 82306 VITAMIN D 25 HYDROXY: CPT | Performed by: INTERNAL MEDICINE

## 2021-09-22 PROCEDURE — 99396 PREV VISIT EST AGE 40-64: CPT | Mod: S$GLB,,, | Performed by: INTERNAL MEDICINE

## 2021-09-22 PROCEDURE — 99999 PR PBB SHADOW E&M-EST. PATIENT-LVL V: ICD-10-PCS | Mod: PBBFAC,,, | Performed by: INTERNAL MEDICINE

## 2021-09-22 PROCEDURE — 80061 LIPID PANEL: CPT | Performed by: INTERNAL MEDICINE

## 2021-09-22 PROCEDURE — 99999 PR PBB SHADOW E&M-EST. PATIENT-LVL V: CPT | Mod: PBBFAC,,, | Performed by: INTERNAL MEDICINE

## 2021-09-22 PROCEDURE — 85025 COMPLETE CBC W/AUTO DIFF WBC: CPT | Performed by: INTERNAL MEDICINE

## 2021-09-22 RX ORDER — PANTOPRAZOLE SODIUM 20 MG/1
TABLET, DELAYED RELEASE ORAL
Qty: 90 TABLET | Refills: 3 | Status: SHIPPED | OUTPATIENT
Start: 2021-09-22 | End: 2023-05-26 | Stop reason: SDUPTHER

## 2021-09-23 LAB
25(OH)D3+25(OH)D2 SERPL-MCNC: 34 NG/ML (ref 30–96)
ALBUMIN SERPL BCP-MCNC: 4.2 G/DL (ref 3.5–5.2)
ALP SERPL-CCNC: 74 U/L (ref 55–135)
ALT SERPL W/O P-5'-P-CCNC: 17 U/L (ref 10–44)
ANION GAP SERPL CALC-SCNC: 12 MMOL/L (ref 8–16)
AST SERPL-CCNC: 20 U/L (ref 10–40)
BASOPHILS # BLD AUTO: 0.05 K/UL (ref 0–0.2)
BASOPHILS NFR BLD: 0.5 % (ref 0–1.9)
BILIRUB SERPL-MCNC: 0.5 MG/DL (ref 0.1–1)
BUN SERPL-MCNC: 10 MG/DL (ref 6–20)
CALCIUM SERPL-MCNC: 10.9 MG/DL (ref 8.7–10.5)
CHLORIDE SERPL-SCNC: 102 MMOL/L (ref 95–110)
CHOLEST SERPL-MCNC: 210 MG/DL (ref 120–199)
CHOLEST/HDLC SERPL: 3.4 {RATIO} (ref 2–5)
CO2 SERPL-SCNC: 26 MMOL/L (ref 23–29)
CREAT SERPL-MCNC: 0.8 MG/DL (ref 0.5–1.4)
DIFFERENTIAL METHOD: ABNORMAL
EOSINOPHIL # BLD AUTO: 0.1 K/UL (ref 0–0.5)
EOSINOPHIL NFR BLD: 1.5 % (ref 0–8)
ERYTHROCYTE [DISTWIDTH] IN BLOOD BY AUTOMATED COUNT: 13.8 % (ref 11.5–14.5)
EST. GFR  (AFRICAN AMERICAN): >60 ML/MIN/1.73 M^2
EST. GFR  (NON AFRICAN AMERICAN): >60 ML/MIN/1.73 M^2
GLUCOSE SERPL-MCNC: 97 MG/DL (ref 70–110)
HCT VFR BLD AUTO: 43.9 % (ref 37–48.5)
HCV AB SERPL QL IA: NEGATIVE
HDLC SERPL-MCNC: 62 MG/DL (ref 40–75)
HDLC SERPL: 29.5 % (ref 20–50)
HGB BLD-MCNC: 14 G/DL (ref 12–16)
IMM GRANULOCYTES # BLD AUTO: 0.03 K/UL (ref 0–0.04)
IMM GRANULOCYTES NFR BLD AUTO: 0.3 % (ref 0–0.5)
LDLC SERPL CALC-MCNC: 124 MG/DL (ref 63–159)
LYMPHOCYTES # BLD AUTO: 3.2 K/UL (ref 1–4.8)
LYMPHOCYTES NFR BLD: 33.5 % (ref 18–48)
MCH RBC QN AUTO: 29.1 PG (ref 27–31)
MCHC RBC AUTO-ENTMCNC: 31.9 G/DL (ref 32–36)
MCV RBC AUTO: 91 FL (ref 82–98)
MONOCYTES # BLD AUTO: 0.6 K/UL (ref 0.3–1)
MONOCYTES NFR BLD: 6 % (ref 4–15)
NEUTROPHILS # BLD AUTO: 5.5 K/UL (ref 1.8–7.7)
NEUTROPHILS NFR BLD: 58.2 % (ref 38–73)
NONHDLC SERPL-MCNC: 148 MG/DL
NRBC BLD-RTO: 0 /100 WBC
PLATELET # BLD AUTO: 330 K/UL (ref 150–450)
PMV BLD AUTO: 9.8 FL (ref 9.2–12.9)
POTASSIUM SERPL-SCNC: 4.1 MMOL/L (ref 3.5–5.1)
PROT SERPL-MCNC: 8.3 G/DL (ref 6–8.4)
RBC # BLD AUTO: 4.81 M/UL (ref 4–5.4)
SODIUM SERPL-SCNC: 140 MMOL/L (ref 136–145)
TRIGL SERPL-MCNC: 120 MG/DL (ref 30–150)
WBC # BLD AUTO: 9.48 K/UL (ref 3.9–12.7)

## 2021-09-24 ENCOUNTER — PATIENT MESSAGE (OUTPATIENT)
Dept: INTERNAL MEDICINE | Facility: CLINIC | Age: 56
End: 2021-09-24

## 2021-09-24 DIAGNOSIS — R73.9 HYPERGLYCEMIA: ICD-10-CM

## 2021-09-24 DIAGNOSIS — E55.9 VITAMIN D DEFICIENCY: Primary | ICD-10-CM

## 2021-09-24 DIAGNOSIS — E83.52 HYPERCALCEMIA: ICD-10-CM

## 2021-09-24 DIAGNOSIS — E83.52 HYPERCALCEMIA: Primary | ICD-10-CM

## 2021-09-28 ENCOUNTER — PATIENT MESSAGE (OUTPATIENT)
Dept: INTERNAL MEDICINE | Facility: CLINIC | Age: 56
End: 2021-09-28

## 2021-10-06 DIAGNOSIS — M25.371 RIGHT ANKLE INSTABILITY: Primary | ICD-10-CM

## 2021-10-12 ENCOUNTER — HOSPITAL ENCOUNTER (OUTPATIENT)
Dept: RADIOLOGY | Facility: HOSPITAL | Age: 56
Discharge: HOME | End: 2021-10-12
Attending: ORTHOPAEDIC SURGERY
Payer: COMMERCIAL

## 2021-10-12 ENCOUNTER — OFFICE VISIT (OUTPATIENT)
Dept: ORTHOPEDICS | Facility: CLINIC | Age: 56
End: 2021-10-12
Payer: COMMERCIAL

## 2021-10-12 DIAGNOSIS — M25.371 RIGHT ANKLE INSTABILITY: ICD-10-CM

## 2021-10-12 PROCEDURE — 99203 PR OFFICE/OUTPT VISIT, NEW, LEVL III, 30-44 MIN: ICD-10-PCS | Mod: S$GLB,,, | Performed by: ORTHOPAEDIC SURGERY

## 2021-10-12 PROCEDURE — 99999 PR PBB SHADOW E&M-EST. PATIENT-LVL II: ICD-10-PCS | Mod: PBBFAC,,, | Performed by: ORTHOPAEDIC SURGERY

## 2021-10-12 PROCEDURE — 99203 OFFICE O/P NEW LOW 30 MIN: CPT | Mod: S$GLB,,, | Performed by: ORTHOPAEDIC SURGERY

## 2021-10-12 PROCEDURE — 73610 XR ANKLE COMPLETE 3 VIEW RIGHT: ICD-10-PCS | Mod: 26,RT,, | Performed by: RADIOLOGY

## 2021-10-12 PROCEDURE — 73610 X-RAY EXAM OF ANKLE: CPT | Mod: 26,RT,, | Performed by: RADIOLOGY

## 2021-10-12 PROCEDURE — 73610 X-RAY EXAM OF ANKLE: CPT | Mod: TC,PO,RT

## 2021-10-12 PROCEDURE — 99999 PR PBB SHADOW E&M-EST. PATIENT-LVL II: CPT | Mod: PBBFAC,,, | Performed by: ORTHOPAEDIC SURGERY

## 2022-08-23 ENCOUNTER — HOSPITAL ENCOUNTER (OUTPATIENT)
Dept: RADIOLOGY | Facility: OTHER | Age: 57
Discharge: HOME | End: 2022-08-23
Attending: OBSTETRICS & GYNECOLOGY
Payer: COMMERCIAL

## 2022-08-23 DIAGNOSIS — Z12.31 SCREENING MAMMOGRAM, ENCOUNTER FOR: ICD-10-CM

## 2022-08-23 PROCEDURE — 77063 BREAST TOMOSYNTHESIS BI: CPT | Mod: TC

## 2022-08-23 PROCEDURE — 77063 MAMMO DIGITAL SCREENING BILAT WITH TOMO: ICD-10-PCS | Mod: 26,,, | Performed by: RADIOLOGY

## 2022-08-23 PROCEDURE — 77067 MAMMO DIGITAL SCREENING BILAT WITH TOMO: ICD-10-PCS | Mod: 26,,, | Performed by: RADIOLOGY

## 2022-08-23 PROCEDURE — 77067 SCR MAMMO BI INCL CAD: CPT | Mod: TC

## 2022-08-23 PROCEDURE — 77067 SCR MAMMO BI INCL CAD: CPT | Mod: 26,,, | Performed by: RADIOLOGY

## 2022-08-23 PROCEDURE — 77063 BREAST TOMOSYNTHESIS BI: CPT | Mod: 26,,, | Performed by: RADIOLOGY

## 2023-01-11 ENCOUNTER — PATIENT MESSAGE (OUTPATIENT)
Dept: OBSTETRICS AND GYNECOLOGY | Facility: CLINIC | Age: 58
End: 2023-01-11
Payer: COMMERCIAL

## 2023-02-13 ENCOUNTER — OFFICE VISIT (OUTPATIENT)
Dept: OBSTETRICS AND GYNECOLOGY | Facility: CLINIC | Age: 58
End: 2023-02-13
Attending: OBSTETRICS & GYNECOLOGY
Payer: COMMERCIAL

## 2023-02-13 VITALS
WEIGHT: 211 LBS | SYSTOLIC BLOOD PRESSURE: 123 MMHG | BODY MASS INDEX: 33.12 KG/M2 | HEIGHT: 67 IN | DIASTOLIC BLOOD PRESSURE: 80 MMHG

## 2023-02-13 DIAGNOSIS — Z12.31 SCREENING MAMMOGRAM, ENCOUNTER FOR: ICD-10-CM

## 2023-02-13 DIAGNOSIS — Z13.820 SCREENING FOR OSTEOPOROSIS: ICD-10-CM

## 2023-02-13 DIAGNOSIS — Z01.419 ENCOUNTER FOR GYNECOLOGICAL EXAMINATION WITHOUT ABNORMAL FINDING: Primary | ICD-10-CM

## 2023-02-13 DIAGNOSIS — N95.1 MENOPAUSAL SYMPTOMS: ICD-10-CM

## 2023-02-13 PROCEDURE — 1160F RVW MEDS BY RX/DR IN RCRD: CPT | Mod: CPTII,S$GLB,, | Performed by: OBSTETRICS & GYNECOLOGY

## 2023-02-13 PROCEDURE — 99999 PR PBB SHADOW E&M-EST. PATIENT-LVL III: ICD-10-PCS | Mod: PBBFAC,,, | Performed by: OBSTETRICS & GYNECOLOGY

## 2023-02-13 PROCEDURE — 3079F DIAST BP 80-89 MM HG: CPT | Mod: CPTII,S$GLB,, | Performed by: OBSTETRICS & GYNECOLOGY

## 2023-02-13 PROCEDURE — 1159F PR MEDICATION LIST DOCUMENTED IN MEDICAL RECORD: ICD-10-PCS | Mod: CPTII,S$GLB,, | Performed by: OBSTETRICS & GYNECOLOGY

## 2023-02-13 PROCEDURE — 99396 PR PREVENTIVE VISIT,EST,40-64: ICD-10-PCS | Mod: S$GLB,,, | Performed by: OBSTETRICS & GYNECOLOGY

## 2023-02-13 PROCEDURE — 3074F SYST BP LT 130 MM HG: CPT | Mod: CPTII,S$GLB,, | Performed by: OBSTETRICS & GYNECOLOGY

## 2023-02-13 PROCEDURE — 3008F PR BODY MASS INDEX (BMI) DOCUMENTED: ICD-10-PCS | Mod: CPTII,S$GLB,, | Performed by: OBSTETRICS & GYNECOLOGY

## 2023-02-13 PROCEDURE — 3074F PR MOST RECENT SYSTOLIC BLOOD PRESSURE < 130 MM HG: ICD-10-PCS | Mod: CPTII,S$GLB,, | Performed by: OBSTETRICS & GYNECOLOGY

## 2023-02-13 PROCEDURE — 3008F BODY MASS INDEX DOCD: CPT | Mod: CPTII,S$GLB,, | Performed by: OBSTETRICS & GYNECOLOGY

## 2023-02-13 PROCEDURE — 99999 PR PBB SHADOW E&M-EST. PATIENT-LVL III: CPT | Mod: PBBFAC,,, | Performed by: OBSTETRICS & GYNECOLOGY

## 2023-02-13 PROCEDURE — 1159F MED LIST DOCD IN RCRD: CPT | Mod: CPTII,S$GLB,, | Performed by: OBSTETRICS & GYNECOLOGY

## 2023-02-13 PROCEDURE — 3079F PR MOST RECENT DIASTOLIC BLOOD PRESSURE 80-89 MM HG: ICD-10-PCS | Mod: CPTII,S$GLB,, | Performed by: OBSTETRICS & GYNECOLOGY

## 2023-02-13 PROCEDURE — 99396 PREV VISIT EST AGE 40-64: CPT | Mod: S$GLB,,, | Performed by: OBSTETRICS & GYNECOLOGY

## 2023-02-13 PROCEDURE — 1160F PR REVIEW ALL MEDS BY PRESCRIBER/CLIN PHARMACIST DOCUMENTED: ICD-10-PCS | Mod: CPTII,S$GLB,, | Performed by: OBSTETRICS & GYNECOLOGY

## 2023-02-13 RX ORDER — ESTRADIOL 1 MG/1
1 TABLET ORAL DAILY
Qty: 90 TABLET | Refills: 3 | Status: SHIPPED | OUTPATIENT
Start: 2023-02-13 | End: 2024-03-18 | Stop reason: SDUPTHER

## 2023-02-13 RX ORDER — PROGESTERONE 200 MG/1
200 CAPSULE ORAL NIGHTLY
Qty: 90 CAPSULE | Refills: 3 | Status: SHIPPED | OUTPATIENT
Start: 2023-02-13 | End: 2023-06-05
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
## 2023-03-23 ENCOUNTER — HOSPITAL ENCOUNTER (OUTPATIENT)
Dept: RADIOLOGY | Facility: OTHER | Age: 58
Discharge: HOME | End: 2023-03-23
Attending: OBSTETRICS & GYNECOLOGY
Payer: COMMERCIAL

## 2023-03-23 DIAGNOSIS — Z13.820 SCREENING FOR OSTEOPOROSIS: ICD-10-CM

## 2023-03-23 PROCEDURE — 77080 DXA BONE DENSITY AXIAL: CPT | Mod: 26,,, | Performed by: RADIOLOGY

## 2023-03-23 PROCEDURE — 77080 DEXA BONE DENSITY SPINE HIP: ICD-10-PCS | Mod: 26,,, | Performed by: RADIOLOGY

## 2023-03-23 PROCEDURE — 77080 DXA BONE DENSITY AXIAL: CPT | Mod: TC

## 2023-05-20 ENCOUNTER — TELEPHONE (OUTPATIENT)
Dept: INTERNAL MEDICINE | Facility: CLINIC | Age: 58
End: 2023-05-20
Payer: COMMERCIAL

## 2023-05-20 DIAGNOSIS — Z00.00 ANNUAL PHYSICAL EXAM: Primary | ICD-10-CM

## 2023-05-20 DIAGNOSIS — E61.1 IRON DEFICIENCY: ICD-10-CM

## 2023-05-20 DIAGNOSIS — Z11.4 SCREENING FOR HIV (HUMAN IMMUNODEFICIENCY VIRUS): ICD-10-CM

## 2023-05-20 NOTE — PROGRESS NOTES
[facility]    Subjective:       Patient ID: Lety Vogel is a 57 y.o. female.    Chief Complaint: Establish Care      History was obtained from the patient and supplemented through chart review.  This patient has not been seen by an [facility] internal medicine physician in the past 3 years and is new to me.    HPI:    Patient is a 57 y.o. female who presents to establish care.    Prediabetes  A1C improved from 5.7 -> 5.5    Fam hx of colon cancer- Dad mid 50's  Follows with Dr. Hall, due June 2024    Fam hx of breast cancer sister hormone responsive  On HRT - Dr. Meier  Wishes to continue HRT despite fam hx, aware of risks, relationship    GERD  Controlled on PPI 20  Precautions given    Past iron def anemia  Normal may 2023    Frustrated with weight  Encouraged movement, tracking diet, exercise  Interested in medication  Etoh maybe too much (discussed in context of TG)    Wt Readings from Last 15 Encounters:   05/26/23 97.4 kg (214 lb 11.7 oz)   02/13/23 95.7 kg (211 lb)   09/22/21 97.7 kg (215 lb 6.2 oz)   08/24/21 97.2 kg (214 lb 3.2 oz)   07/28/21 97.9 kg (215 lb 13.3 oz)   06/24/20 99.9 kg (220 lb 3.8 oz)   06/10/19 97.5 kg (215 lb)   04/08/19 90.7 kg (200 lb)   02/21/19 90.7 kg (200 lb)   01/24/19 90.7 kg (200 lb)   01/11/19 90.7 kg (200 lb)   01/08/19 90.7 kg (200 lb)   01/04/19 90.7 kg (200 lb)   12/20/17 89.8 kg (198 lb)   07/18/17 89.8 kg (198 lb)        Medical History  Past Medical History:   Diagnosis Date    Anxiety     Early menopause     GERD (gastroesophageal reflux disease)     Prediabetes        Review of Systems   Constitutional:  Positive for unexpected weight change (frustrated with weight). Negative for fever.   HENT:  Negative for trouble swallowing.    Respiratory:  Negative for shortness of breath.    Cardiovascular:  Negative for chest pain.   Gastrointestinal:  Negative for constipation, diarrhea, nausea and vomiting.   Musculoskeletal:  Negative for gait  "problem.   Neurological:  Negative for dizziness and seizures.   Psychiatric/Behavioral:  Negative for hallucinations.        Surgical hx, family hx, social hx   Have been reviewed      Current Outpatient Medications:     alprazolam (XANAX) 0.5 MG tablet, Take 1 tablet (0.5 mg total) by mouth daily as needed for Anxiety. 1 Tablet Oral Every day, Disp: 90 tablet, Rfl: 1    ascorbic acid, vitamin C, (VITAMIN C) 500 MG tablet, Take 1 tablet (500 mg total) by mouth once daily., Disp: 50 tablet, Rfl: 0    calcium-vitamin D3 500 mg(1,250mg) -200 unit per tablet, Take 1 tablet by mouth 2 (two) times daily with meals., Disp: , Rfl:     estradioL (ESTRACE) 1 MG tablet, Take 1 tablet (1 mg total) by mouth once daily., Disp: 90 tablet, Rfl: 3    glucosamine-chondroitin 500-400 mg tablet, Take 1 tablet by mouth 3 (three) times daily., Disp: , Rfl:     multivitamin capsule, Take 1 capsule by mouth once daily., Disp: , Rfl:     progesterone (PROMETRIUM) 200 MG capsule, Take 1 capsule (200 mg total) by mouth every evening., Disp: 90 capsule, Rfl: 3    pantoprazole (PROTONIX) 20 MG tablet, Take first thing on an empty in the morning and eat 45 minutes later, Disp: 90 tablet, Rfl: 3    Objective:        Body mass index is 33.63 kg/m².  Vitals:    05/26/23 0915   BP: 120/74   Pulse: 82   SpO2: 98%   Weight: 97.4 kg (214 lb 11.7 oz)   Height: 5' 7" (1.702 m)   PainSc: 0-No pain     Physical Exam  Vitals and nursing note reviewed.   Constitutional:       General: She is not in acute distress.     Appearance: Normal appearance. She is not ill-appearing.   HENT:      Head: Normocephalic and atraumatic.   Eyes:      General: No scleral icterus.  Cardiovascular:      Rate and Rhythm: Normal rate and regular rhythm.      Heart sounds: Normal heart sounds.   Pulmonary:      Effort: Pulmonary effort is normal.   Musculoskeletal:         General: No deformity.      Cervical back: Normal range of motion.   Skin:     General: Skin is warm and " dry.   Neurological:      Mental Status: She is alert and oriented to person, place, and time.   Psychiatric:         Behavior: Behavior normal.         Lab Results   Component Value Date    WBC 6.12 05/25/2023    HGB 13.2 05/25/2023    HCT 41.4 05/25/2023     05/25/2023    CHOL 211 (H) 05/25/2023    TRIG 139 05/25/2023    HDL 55 05/25/2023    ALT 17 05/25/2023    AST 21 05/25/2023     05/25/2023    K 4.0 05/25/2023     05/25/2023    CREATININE 0.8 05/25/2023    BUN 9 05/25/2023    CO2 25 05/25/2023    TSH 2.288 05/25/2023    HGBA1C 5.5 05/25/2023       The 10-year ASCVD risk score (Andreina HOFFMAN, et al., 2019) is: 2%    Values used to calculate the score:      Age: 57 years      Sex: Female      Is Non- : No      Diabetic: No      Tobacco smoker: No      Systolic Blood Pressure: 120 mmHg      Is BP treated: No      HDL Cholesterol: 55 mg/dL      Total Cholesterol: 211 mg/dL    (Imaging have been independently reviewed)  Dexa 3/23/2023 reviewed    Assessment:         1. Establishing care with new doctor, encounter for    2. Annual physical exam    3. At high risk for breast cancer    4. Family history of colon cancer    5. Family history of diabetes mellitus    6. Gastroesophageal reflux disease without esophagitis    7. Iron deficiency    8. Sedentary lifestyle          Plan:     Lety was seen today for establish care.    Diagnoses and all orders for this visit:    Establishing care with new doctor, encounter for    Annual physical exam    At high risk for breast cancer  -     Ambulatory referral/consult to Breast Surgery; Future    Family history of colon cancer    Family history of diabetes mellitus    Gastroesophageal reflux disease without esophagitis  -     pantoprazole (PROTONIX) 20 MG tablet; Take first thing on an empty in the morning and eat 45 minutes later    Iron deficiency    Sedentary lifestyle        Health Maintenance  - Lipids: normal 5/25/2023  - A1C: 5.5,  down from 5.7  - Colon Ca Screen: fam hx of colon cance rin Dad, due 2024  - Immunizations:    Women's health  - Pap: Dr. Meier  - Mammo: fam hx, TC score 25  - Dexa: normal 3/2023  - Contraception: HRT    All of your core healthy metrics are met.      Follow up in about 3 months (around 8/26/2023) for weight. or sooner prn        All medications were reviewed including potential side effects and risks/benefits.  Pt was counseled to call back if anything worsens or if questions arise.        Erick George MD  Family Medicine  [facility]  [address] [address]  Phone [phone]  Fax [phone]

## 2023-05-25 ENCOUNTER — LAB VISIT (OUTPATIENT)
Dept: LAB | Facility: HOSPITAL | Age: 58
End: 2023-05-25
Payer: COMMERCIAL

## 2023-05-25 DIAGNOSIS — Z11.4 SCREENING FOR HIV (HUMAN IMMUNODEFICIENCY VIRUS): ICD-10-CM

## 2023-05-25 DIAGNOSIS — E61.1 IRON DEFICIENCY: ICD-10-CM

## 2023-05-25 DIAGNOSIS — Z00.00 ANNUAL PHYSICAL EXAM: ICD-10-CM

## 2023-05-25 LAB
ALBUMIN SERPL BCP-MCNC: 3.8 G/DL (ref 3.5–5.2)
ALP SERPL-CCNC: 52 U/L (ref 55–135)
ALT SERPL W/O P-5'-P-CCNC: 17 U/L (ref 10–44)
ANION GAP SERPL CALC-SCNC: 10 MMOL/L (ref 8–16)
AST SERPL-CCNC: 21 U/L (ref 10–40)
BASOPHILS # BLD AUTO: 0.05 K/UL (ref 0–0.2)
BASOPHILS NFR BLD: 0.8 % (ref 0–1.9)
BILIRUB SERPL-MCNC: 0.5 MG/DL (ref 0.1–1)
BUN SERPL-MCNC: 9 MG/DL (ref 6–20)
CALCIUM SERPL-MCNC: 9.5 MG/DL (ref 8.7–10.5)
CHLORIDE SERPL-SCNC: 105 MMOL/L (ref 95–110)
CHOLEST SERPL-MCNC: 211 MG/DL (ref 120–199)
CHOLEST/HDLC SERPL: 3.8 {RATIO} (ref 2–5)
CO2 SERPL-SCNC: 25 MMOL/L (ref 23–29)
CREAT SERPL-MCNC: 0.8 MG/DL (ref 0.5–1.4)
DIFFERENTIAL METHOD: ABNORMAL
EOSINOPHIL # BLD AUTO: 0.2 K/UL (ref 0–0.5)
EOSINOPHIL NFR BLD: 2.6 % (ref 0–8)
ERYTHROCYTE [DISTWIDTH] IN BLOOD BY AUTOMATED COUNT: 13.2 % (ref 11.5–14.5)
EST. GFR  (NO RACE VARIABLE): >60 ML/MIN/1.73 M^2
ESTIMATED AVG GLUCOSE: 111 MG/DL (ref 68–131)
FERRITIN SERPL-MCNC: 75 NG/ML (ref 20–300)
GLUCOSE SERPL-MCNC: 100 MG/DL (ref 70–110)
HBA1C MFR BLD: 5.5 % (ref 4–5.6)
HCT VFR BLD AUTO: 41.4 % (ref 37–48.5)
HDLC SERPL-MCNC: 55 MG/DL (ref 40–75)
HDLC SERPL: 26.1 % (ref 20–50)
HGB BLD-MCNC: 13.2 G/DL (ref 12–16)
HIV 1+2 AB+HIV1 P24 AG SERPL QL IA: NORMAL
IMM GRANULOCYTES # BLD AUTO: 0.01 K/UL (ref 0–0.04)
IMM GRANULOCYTES NFR BLD AUTO: 0.2 % (ref 0–0.5)
IRON SERPL-MCNC: 78 UG/DL (ref 30–160)
LDLC SERPL CALC-MCNC: 128.2 MG/DL (ref 63–159)
LYMPHOCYTES # BLD AUTO: 2.2 K/UL (ref 1–4.8)
LYMPHOCYTES NFR BLD: 36.6 % (ref 18–48)
MCH RBC QN AUTO: 29.4 PG (ref 27–31)
MCHC RBC AUTO-ENTMCNC: 31.9 G/DL (ref 32–36)
MCV RBC AUTO: 92 FL (ref 82–98)
MONOCYTES # BLD AUTO: 0.5 K/UL (ref 0.3–1)
MONOCYTES NFR BLD: 8.7 % (ref 4–15)
NEUTROPHILS # BLD AUTO: 3.1 K/UL (ref 1.8–7.7)
NEUTROPHILS NFR BLD: 51.1 % (ref 38–73)
NONHDLC SERPL-MCNC: 156 MG/DL
NRBC BLD-RTO: 0 /100 WBC
PLATELET # BLD AUTO: 295 K/UL (ref 150–450)
PMV BLD AUTO: 9.6 FL (ref 9.2–12.9)
POTASSIUM SERPL-SCNC: 4 MMOL/L (ref 3.5–5.1)
PROT SERPL-MCNC: 7.1 G/DL (ref 6–8.4)
RBC # BLD AUTO: 4.49 M/UL (ref 4–5.4)
SATURATED IRON: 21 % (ref 20–50)
SODIUM SERPL-SCNC: 140 MMOL/L (ref 136–145)
TOTAL IRON BINDING CAPACITY: 363 UG/DL (ref 250–450)
TRANSFERRIN SERPL-MCNC: 245 MG/DL (ref 200–375)
TRIGL SERPL-MCNC: 139 MG/DL (ref 30–150)
TSH SERPL DL<=0.005 MIU/L-ACNC: 2.29 UIU/ML (ref 0.4–4)
WBC # BLD AUTO: 6.12 K/UL (ref 3.9–12.7)

## 2023-05-25 PROCEDURE — 85025 COMPLETE CBC W/AUTO DIFF WBC: CPT | Performed by: STUDENT IN AN ORGANIZED HEALTH CARE EDUCATION/TRAINING PROGRAM

## 2023-05-25 PROCEDURE — 82728 ASSAY OF FERRITIN: CPT | Performed by: STUDENT IN AN ORGANIZED HEALTH CARE EDUCATION/TRAINING PROGRAM

## 2023-05-25 PROCEDURE — 87389 HIV-1 AG W/HIV-1&-2 AB AG IA: CPT | Performed by: STUDENT IN AN ORGANIZED HEALTH CARE EDUCATION/TRAINING PROGRAM

## 2023-05-25 PROCEDURE — 80061 LIPID PANEL: CPT | Performed by: STUDENT IN AN ORGANIZED HEALTH CARE EDUCATION/TRAINING PROGRAM

## 2023-05-25 PROCEDURE — 83036 HEMOGLOBIN GLYCOSYLATED A1C: CPT | Performed by: STUDENT IN AN ORGANIZED HEALTH CARE EDUCATION/TRAINING PROGRAM

## 2023-05-25 PROCEDURE — 84466 ASSAY OF TRANSFERRIN: CPT | Performed by: STUDENT IN AN ORGANIZED HEALTH CARE EDUCATION/TRAINING PROGRAM

## 2023-05-25 PROCEDURE — 80053 COMPREHEN METABOLIC PANEL: CPT | Performed by: STUDENT IN AN ORGANIZED HEALTH CARE EDUCATION/TRAINING PROGRAM

## 2023-05-25 PROCEDURE — 36415 COLL VENOUS BLD VENIPUNCTURE: CPT | Performed by: STUDENT IN AN ORGANIZED HEALTH CARE EDUCATION/TRAINING PROGRAM

## 2023-05-25 PROCEDURE — 84443 ASSAY THYROID STIM HORMONE: CPT | Performed by: STUDENT IN AN ORGANIZED HEALTH CARE EDUCATION/TRAINING PROGRAM

## 2023-05-26 ENCOUNTER — OFFICE VISIT (OUTPATIENT)
Dept: INTERNAL MEDICINE | Facility: CLINIC | Age: 58
End: 2023-05-26
Payer: COMMERCIAL

## 2023-05-26 VITALS
HEIGHT: 67 IN | SYSTOLIC BLOOD PRESSURE: 120 MMHG | HEART RATE: 82 BPM | OXYGEN SATURATION: 98 % | WEIGHT: 214.75 LBS | BODY MASS INDEX: 33.71 KG/M2 | DIASTOLIC BLOOD PRESSURE: 74 MMHG

## 2023-05-26 DIAGNOSIS — Z91.89 AT HIGH RISK FOR BREAST CANCER: ICD-10-CM

## 2023-05-26 DIAGNOSIS — Z00.00 ANNUAL PHYSICAL EXAM: ICD-10-CM

## 2023-05-26 DIAGNOSIS — Z76.89 ESTABLISHING CARE WITH NEW DOCTOR, ENCOUNTER FOR: Primary | ICD-10-CM

## 2023-05-26 DIAGNOSIS — Z80.0 FAMILY HISTORY OF COLON CANCER: ICD-10-CM

## 2023-05-26 DIAGNOSIS — E61.1 IRON DEFICIENCY: ICD-10-CM

## 2023-05-26 DIAGNOSIS — K21.9 GASTROESOPHAGEAL REFLUX DISEASE WITHOUT ESOPHAGITIS: ICD-10-CM

## 2023-05-26 DIAGNOSIS — Z91.89 SEDENTARY LIFESTYLE: ICD-10-CM

## 2023-05-26 DIAGNOSIS — Z83.3 FAMILY HISTORY OF DIABETES MELLITUS: ICD-10-CM

## 2023-05-26 PROCEDURE — 99386 PR PREVENTIVE VISIT,NEW,40-64: ICD-10-PCS | Mod: S$GLB,,, | Performed by: STUDENT IN AN ORGANIZED HEALTH CARE EDUCATION/TRAINING PROGRAM

## 2023-05-26 PROCEDURE — 99999 PR PBB SHADOW E&M-EST. PATIENT-LVL IV: ICD-10-PCS | Mod: PBBFAC,,, | Performed by: STUDENT IN AN ORGANIZED HEALTH CARE EDUCATION/TRAINING PROGRAM

## 2023-05-26 PROCEDURE — 99386 PREV VISIT NEW AGE 40-64: CPT | Mod: S$GLB,,, | Performed by: STUDENT IN AN ORGANIZED HEALTH CARE EDUCATION/TRAINING PROGRAM

## 2023-05-26 PROCEDURE — 99999 PR PBB SHADOW E&M-EST. PATIENT-LVL IV: CPT | Mod: PBBFAC,,, | Performed by: STUDENT IN AN ORGANIZED HEALTH CARE EDUCATION/TRAINING PROGRAM

## 2023-05-26 RX ORDER — PANTOPRAZOLE SODIUM 20 MG/1
TABLET, DELAYED RELEASE ORAL
Qty: 90 TABLET | Refills: 3 | Status: SHIPPED | OUTPATIENT
Start: 2023-05-26

## 2023-05-30 ENCOUNTER — PATIENT MESSAGE (OUTPATIENT)
Dept: OBSTETRICS AND GYNECOLOGY | Facility: CLINIC | Age: 58
End: 2023-05-30
Payer: COMMERCIAL

## 2023-05-30 RX ORDER — CLOTRIMAZOLE AND BETAMETHASONE DIPROPIONATE 10; .64 MG/G; MG/G
CREAM TOPICAL
Qty: 15 G | Refills: 1 | Status: SHIPPED | OUTPATIENT
Start: 2023-05-30 | End: 2024-05-29

## 2023-05-30 NOTE — TELEPHONE ENCOUNTER
I have been using Clotrimazole and Betamethasone 1%/.05% cream. I need a refill on this. I believe Dr. Meier prescribed this a few years ago.  Advised pt will have refills sent in pt was seen 2/2023

## 2023-06-05 DIAGNOSIS — N95.1 MENOPAUSAL SYMPTOMS: ICD-10-CM

## 2023-06-05 RX ORDER — PROGESTERONE 200 MG/1
200 CAPSULE ORAL NIGHTLY
Qty: 90 CAPSULE | Refills: 3 | Status: SHIPPED | OUTPATIENT
Start: 2023-06-05

## 2023-08-25 ENCOUNTER — HOSPITAL ENCOUNTER (OUTPATIENT)
Dept: RADIOLOGY | Facility: HOSPITAL | Age: 58
Discharge: HOME | End: 2023-08-25
Attending: OBSTETRICS & GYNECOLOGY
Payer: COMMERCIAL

## 2023-08-25 VITALS — WEIGHT: 208 LBS | BODY MASS INDEX: 32.58 KG/M2

## 2023-08-25 DIAGNOSIS — Z12.31 SCREENING MAMMOGRAM, ENCOUNTER FOR: ICD-10-CM

## 2023-08-25 PROCEDURE — 77063 MAMMO DIGITAL SCREENING BILAT WITH TOMO: ICD-10-PCS | Mod: 26,,, | Performed by: RADIOLOGY

## 2023-08-25 PROCEDURE — 77063 BREAST TOMOSYNTHESIS BI: CPT | Mod: 26,,, | Performed by: RADIOLOGY

## 2023-08-25 PROCEDURE — 77067 MAMMO DIGITAL SCREENING BILAT WITH TOMO: ICD-10-PCS | Mod: 26,,, | Performed by: RADIOLOGY

## 2023-08-25 PROCEDURE — 77067 SCR MAMMO BI INCL CAD: CPT | Mod: TC

## 2023-08-25 PROCEDURE — 77067 SCR MAMMO BI INCL CAD: CPT | Mod: 26,,, | Performed by: RADIOLOGY

## 2023-11-14 ENCOUNTER — OFFICE VISIT (OUTPATIENT)
Dept: INTERNAL MEDICINE | Facility: CLINIC | Age: 58
End: 2023-11-14
Payer: COMMERCIAL

## 2023-11-14 VITALS
HEIGHT: 67 IN | BODY MASS INDEX: 32.11 KG/M2 | DIASTOLIC BLOOD PRESSURE: 62 MMHG | WEIGHT: 204.56 LBS | HEART RATE: 81 BPM | SYSTOLIC BLOOD PRESSURE: 111 MMHG

## 2023-11-14 DIAGNOSIS — M25.561 KNEE PAIN, RIGHT ANTERIOR: Primary | ICD-10-CM

## 2023-11-14 DIAGNOSIS — M25.512 LEFT SHOULDER PAIN, UNSPECIFIED CHRONICITY: ICD-10-CM

## 2023-11-14 PROCEDURE — 99213 PR OFFICE/OUTPT VISIT, EST, LEVL III, 20-29 MIN: ICD-10-PCS | Mod: S$GLB,,,

## 2023-11-14 PROCEDURE — 99213 OFFICE O/P EST LOW 20 MIN: CPT | Mod: S$GLB,,,

## 2023-11-14 PROCEDURE — 99999 PR PBB SHADOW E&M-EST. PATIENT-LVL III: ICD-10-PCS | Mod: PBBFAC,,,

## 2023-11-14 PROCEDURE — 99999 PR PBB SHADOW E&M-EST. PATIENT-LVL III: CPT | Mod: PBBFAC,,,

## 2023-11-14 NOTE — PROGRESS NOTES
Subjective     Patient ID: Lety Vogel is a 57 y.o. female.    Chief Complaint: Knee Pain (States she has knee pain with lump/Also states pain in left arm/ neck region./)    Pt seen in clinic today for knee pain and shoulder pain. Pt reports a fall in August in which she fell on her R knee. She noticed some tenderness when she kneels on it several weeks later. States that tenderness is just when she kneels on the tibial tuberosity. Denies pain when she is walking or bearing weight. Has not felt the need to take anything for the pain since it is only when she kneels. Shoulder pain is recurrent from 1-2 years ago. She states she had this before but it resolved and is now back. Pain is centered on insertion site of teres muscles on L humerus. Reports that it is worse when she wakes, but is present throughout the day. Pain refers up into neck and down arm at times. 3/10. Pt has tried a heating pad with minimal benefit.      Review of Systems   Constitutional:  Negative for activity change and unexpected weight change.   HENT:  Negative for hearing loss, rhinorrhea and trouble swallowing.    Eyes:  Negative for discharge and visual disturbance.   Respiratory:  Negative for chest tightness and wheezing.    Cardiovascular:  Negative for chest pain and palpitations.   Gastrointestinal:  Negative for blood in stool, constipation, diarrhea and vomiting.   Endocrine: Negative for polydipsia and polyuria.   Genitourinary:  Negative for difficulty urinating, dysuria, hematuria and menstrual problem.   Musculoskeletal:  Negative for arthralgias, joint swelling and neck pain.   Neurological:  Negative for weakness and headaches.   Psychiatric/Behavioral:  Negative for confusion and dysphoric mood.           Objective     Physical Exam  Vitals reviewed.   Constitutional:       Appearance: Normal appearance. She is well-developed.   HENT:      Head: Normocephalic and atraumatic.   Eyes:      Conjunctiva/sclera: Conjunctivae  normal.   Cardiovascular:      Rate and Rhythm: Normal rate.   Pulmonary:      Effort: Pulmonary effort is normal. No respiratory distress.   Musculoskeletal:      Left shoulder: Tenderness present. Decreased range of motion.        Arms:    Skin:     General: Skin is warm and dry.      Findings: No rash.   Neurological:      Mental Status: She is alert and oriented to person, place, and time.      Coordination: Coordination normal.   Psychiatric:         Behavior: Behavior normal.            Assessment and Plan     1. Knee pain, right anterior  Comments:  Will monitor issue. Not causing any limits of movement. Discussed xray and will order if issue does not resolve.    2. Left shoulder pain, unspecified chronicity    Suggested pt use linement oil and heating pad for 15 minutes 2-3 x day, and a massager for 15 minutes once per day. If condition does not begin to improve in one week will order muscle relaxer and an ortho referral. Pt agrees with plan.             No follow-ups on file.

## 2024-03-18 ENCOUNTER — TELEPHONE (OUTPATIENT)
Dept: INTERNAL MEDICINE | Facility: CLINIC | Age: 59
End: 2024-03-18
Payer: COMMERCIAL

## 2024-03-18 ENCOUNTER — PATIENT MESSAGE (OUTPATIENT)
Dept: ADMINISTRATIVE | Facility: HOSPITAL | Age: 59
End: 2024-03-18
Payer: COMMERCIAL

## 2024-03-18 DIAGNOSIS — Z12.11 SCREENING FOR MALIGNANT NEOPLASM OF COLON: Primary | ICD-10-CM

## 2024-03-18 DIAGNOSIS — N95.1 MENOPAUSAL SYMPTOMS: ICD-10-CM

## 2024-03-18 RX ORDER — ESTRADIOL 1 MG/1
1 TABLET ORAL DAILY
Qty: 90 TABLET | Refills: 0 | Status: SHIPPED | OUTPATIENT
Start: 2024-03-18 | End: 2024-04-24

## 2024-03-18 NOTE — TELEPHONE ENCOUNTER
----- Message from Olena Gregorio MA sent at 3/18/2024 10:27 AM CDT -----  Regarding: Appointment  Good morning,      Patient is reaching out to schedule her colonoscopy.    Pt contact [phone]

## 2024-04-24 DIAGNOSIS — N95.1 MENOPAUSAL SYMPTOMS: ICD-10-CM

## 2024-04-24 RX ORDER — ESTRADIOL 1 MG/1
1 TABLET ORAL DAILY
Qty: 90 TABLET | Refills: 0 | Status: SHIPPED | OUTPATIENT
Start: 2024-04-24

## 2024-04-24 RX ORDER — PROGESTERONE 200 MG/1
200 CAPSULE ORAL NIGHTLY
Qty: 90 CAPSULE | Refills: 0 | Status: SHIPPED | OUTPATIENT
Start: 2024-04-24

## 2024-04-24 NOTE — TELEPHONE ENCOUNTER
Refill Decision Note   Lety Vogel  is requesting a refill authorization.  Brief Assessment and Rationale for Refill:  Approve     Medication Therapy Plan:  Progesterone - PCP previously prescribed rx.       Comments:     Note composed:6:24 PM 04/24/2024

## 2024-05-21 ENCOUNTER — CLINICAL SUPPORT (OUTPATIENT)
Dept: ENDOSCOPY | Facility: HOSPITAL | Age: 59
End: 2024-05-21
Payer: COMMERCIAL

## 2024-05-21 VITALS — HEIGHT: 67 IN | BODY MASS INDEX: 31.83 KG/M2 | WEIGHT: 202.81 LBS

## 2024-05-21 DIAGNOSIS — Z12.11 SCREENING FOR MALIGNANT NEOPLASM OF COLON: ICD-10-CM

## 2024-05-21 NOTE — PLAN OF CARE
We attempted to reach you to Schedule procedure. Please call back at [phone]. Left Voicemail message to return call and sent My [facility] message to pt.

## 2024-05-27 ENCOUNTER — TELEPHONE (OUTPATIENT)
Dept: ENDOSCOPY | Facility: HOSPITAL | Age: 59
End: 2024-05-27
Payer: COMMERCIAL

## 2024-05-27 VITALS — WEIGHT: 192 LBS | BODY MASS INDEX: 30.86 KG/M2 | HEIGHT: 66 IN

## 2024-05-27 DIAGNOSIS — Z12.11 SCREENING FOR MALIGNANT NEOPLASM OF COLON: Primary | ICD-10-CM

## 2024-05-27 NOTE — PLAN OF CARE
Spoke to pt to schedule procedure(s) Colonoscopy       Physician to perform procedure(s) Dr. DOLORES Russo  Date of Procedure (s) 8/5/24  Arrival Time 8:30 AM  Time of Procedure(s) 9:30 AM   Location of Procedure(s) [place] [address]nd Floor  Type of Rx Prep sent to patient: Miralax  Instructions provided to [facility] via [facility]    Patient was informed on the following information and verbalized understanding. Screening questionnaire reviewed with patient and complete. If procedure requires anesthesia, a responsible adult needs to be present to accompany the patient home, patient cannot drive after receiving anesthesia. Appointment details are tentative, especially check-in time. Patient will receive a prep-op call 7 days prior to confirm check-in time for procedure. If applicable the patient should contact their pharmacy to verify Rx for procedure prep is ready for pick-up. Patient was advised to call the scheduling department at [phone] if pharmacy states no Rx is available. Patient was advised to call the endoscopy scheduling department if any questions or concerns arise.      [facility]oscopy Scheduling Department

## 2024-05-27 NOTE — TELEPHONE ENCOUNTER
Spoke to pt to schedule procedure(s) Colonoscopy       Physician to perform procedure(s) Dr. DOLORES Russo  Date of Procedure (s) 8/5/24  Arrival Time 8:30 AM  Time of Procedure(s) 9:30 AM   Location of Procedure(s) [place] 2nd Floor  Type of Rx Prep sent to patient: Miralax  Instructions provided to [facility] via [facility]    Patient was informed on the following information and verbalized understanding. Screening questionnaire reviewed with patient and complete. If procedure requires anesthesia, a responsible adult needs to be present to accompany the patient home, patient cannot drive after receiving anesthesia. Appointment details are tentative, especially check-in time. Patient will receive a prep-op call 7 days prior to confirm check-in time for procedure. If applicable the patient should contact their pharmacy to verify Rx for procedure prep is ready for pick-up. Patient was advised to call the scheduling department at [phone] if pharmacy states no Rx is available. Patient was advised to call the endoscopy scheduling department if any questions or concerns arise.      [facility] Endoscopy Scheduling Department

## 2024-07-24 ENCOUNTER — ANESTHESIA EVENT (OUTPATIENT)
Dept: ENDOSCOPY | Facility: HOSPITAL | Age: 59
End: 2024-07-24
Payer: COMMERCIAL

## 2024-07-25 DIAGNOSIS — N95.1 MENOPAUSAL SYMPTOMS: ICD-10-CM

## 2024-07-25 DIAGNOSIS — K21.9 GASTROESOPHAGEAL REFLUX DISEASE WITHOUT ESOPHAGITIS: ICD-10-CM

## 2024-07-25 RX ORDER — PANTOPRAZOLE SODIUM 20 MG/1
TABLET, DELAYED RELEASE ORAL
Qty: 90 TABLET | Refills: 0 | Status: SHIPPED | OUTPATIENT
Start: 2024-07-25

## 2024-07-25 NOTE — TELEPHONE ENCOUNTER
Refill Routing Note   Medication(s) are not appropriate for processing by [facility] for the following reason(s):        Patient not seen by provider within 15 months    [facility] action(s):  Defer               Appointments  past 12m or future 3m with PCP    Date Provider   Last Visit   2/13/2023 Frieda Meier MD   Next Visit   Visit date not found Frieda Meier MD   ED visits in past 90 days: 0        Note composed:1:13 PM 07/25/2024

## 2024-07-25 NOTE — TELEPHONE ENCOUNTER
No care due was identified.  [facility] Embedded Care Due Messages. Reference number: [id number].   7/25/2024 10:14:25 AM CDT

## 2024-07-29 ENCOUNTER — TELEPHONE (OUTPATIENT)
Dept: ENDOSCOPY | Facility: HOSPITAL | Age: 59
End: 2024-07-29
Payer: COMMERCIAL

## 2024-07-29 RX ORDER — PROGESTERONE 200 MG/1
200 CAPSULE ORAL NIGHTLY
Qty: 90 CAPSULE | Refills: 0 | Status: SHIPPED | OUTPATIENT
Start: 2024-07-29

## 2024-07-29 RX ORDER — ESTRADIOL 1 MG/1
1 TABLET ORAL
Qty: 90 TABLET | Refills: 0 | Status: SHIPPED | OUTPATIENT
Start: 2024-07-29

## 2024-07-29 NOTE — TELEPHONE ENCOUNTER
Spoke to patient for pre-call to confirm scheduled Colonoscopy and patient verbalized understanding of the following:       Date & arrival time of procedure(s) verified 8/5/24, 8:30 AM.  Location of procedure(s) [place] 2nd Floor verified.  NPO status reinforced. Ok to continue clear liquids until 5:30 AM.   Patient is taking Ozempic (Semaglutide), instructed to take last dose on/before 7/28/24.   Patient confirmed receipt of prep instructions and prep.  Instructions provided to patient via [facility].  Patient confirmed ride home after procedure if procedure requires anesthesia.   Pre-call screening questionnaire reviewed and completed with patient.   Appointment details are tentative, including check-in time.  If the patient begins taking any blood thinning medications, injectable weight loss/diabetes medications (other than insulin), or Adipex (phentermine) patient was instructed to contact the endoscopy scheduling department as soon as possible.  Patient was advised to call the endoscopy scheduling department if any questions or concerns arise.      Endoscopy Scheduling Department

## 2024-08-05 ENCOUNTER — HOSPITAL ENCOUNTER (OUTPATIENT)
Facility: HOSPITAL | Age: 59
Discharge: HOME | End: 2024-08-05
Attending: INTERNAL MEDICINE | Admitting: INTERNAL MEDICINE
Payer: COMMERCIAL

## 2024-08-05 ENCOUNTER — ANESTHESIA (OUTPATIENT)
Dept: ENDOSCOPY | Facility: HOSPITAL | Age: 59
End: 2024-08-05
Payer: COMMERCIAL

## 2024-08-05 VITALS
RESPIRATION RATE: 18 BRPM | WEIGHT: 189 LBS | SYSTOLIC BLOOD PRESSURE: 118 MMHG | HEART RATE: 73 BPM | BODY MASS INDEX: 30.37 KG/M2 | TEMPERATURE: 98 F | DIASTOLIC BLOOD PRESSURE: 76 MMHG | HEIGHT: 66 IN | OXYGEN SATURATION: 99 %

## 2024-08-05 DIAGNOSIS — Z80.0 FAMILY HISTORY OF COLON CANCER: Primary | ICD-10-CM

## 2024-08-05 PROCEDURE — D9220A PRA ANESTHESIA: Mod: ,,, | Performed by: NURSE ANESTHETIST, CERTIFIED REGISTERED

## 2024-08-05 PROCEDURE — G0105 COLORECTAL SCRN; HI RISK IND: HCPCS | Mod: ,,, | Performed by: INTERNAL MEDICINE

## 2024-08-05 PROCEDURE — G0105 COLORECTAL SCRN; HI RISK IND: HCPCS | Performed by: INTERNAL MEDICINE

## 2024-08-05 PROCEDURE — 37000009 HC ANESTHESIA EA ADD 15 MINS: Performed by: INTERNAL MEDICINE

## 2024-08-05 PROCEDURE — 63600175 PHARM REV CODE 636 W HCPCS: Performed by: NURSE ANESTHETIST, CERTIFIED REGISTERED

## 2024-08-05 PROCEDURE — 37000008 HC ANESTHESIA 1ST 15 MINUTES: Performed by: INTERNAL MEDICINE

## 2024-08-05 PROCEDURE — 99900035 HC TECH TIME PER 15 MIN (STAT)

## 2024-08-05 PROCEDURE — 25000003 PHARM REV CODE 250: Performed by: NURSE ANESTHETIST, CERTIFIED REGISTERED

## 2024-08-05 PROCEDURE — 94761 N-INVAS EAR/PLS OXIMETRY MLT: CPT

## 2024-08-05 RX ORDER — SODIUM CHLORIDE 9 MG/ML
INJECTION, SOLUTION INTRAVENOUS CONTINUOUS
Status: DISCONTINUED | OUTPATIENT
Start: 2024-08-05 | End: 2024-08-05 | Stop reason: HOSPADM

## 2024-08-05 RX ORDER — PROPOFOL 10 MG/ML
VIAL (ML) INTRAVENOUS
Status: DISCONTINUED | OUTPATIENT
Start: 2024-08-05 | End: 2024-08-05

## 2024-08-05 RX ORDER — PROPOFOL 10 MG/ML
VIAL (ML) INTRAVENOUS CONTINUOUS PRN
Status: DISCONTINUED | OUTPATIENT
Start: 2024-08-05 | End: 2024-08-05

## 2024-08-05 RX ORDER — LIDOCAINE HYDROCHLORIDE 20 MG/ML
INJECTION INTRAVENOUS
Status: DISCONTINUED | OUTPATIENT
Start: 2024-08-05 | End: 2024-08-05

## 2024-08-05 RX ADMIN — SODIUM CHLORIDE: 0.9 INJECTION, SOLUTION INTRAVENOUS at 09:08

## 2024-08-05 RX ADMIN — LIDOCAINE HYDROCHLORIDE 100 MG: 20 INJECTION INTRAVENOUS at 09:08

## 2024-08-05 RX ADMIN — GLYCOPYRROLATE 0.1 MG: 0.2 INJECTION, SOLUTION INTRAMUSCULAR; INTRAVENOUS at 09:08

## 2024-08-05 RX ADMIN — PROPOFOL 200 MCG/KG/MIN: 10 INJECTION, EMULSION INTRAVENOUS at 09:08

## 2024-08-05 RX ADMIN — PROPOFOL 100 MG: 10 INJECTION, EMULSION INTRAVENOUS at 09:08

## 2024-08-05 NOTE — PLAN OF CARE
Pt in preop VSS, PIV inserted. Pt needs  Procedural & anesthesia consents otherwise ready to roll.

## 2024-08-05 NOTE — PLAN OF CARE
Pt arrived to recovery dosc via stretcher per ENDO team. Bedside report received. Pt attached to bedside monitor. VSS. Pt resting without distress post procedure. Pt on room air; oxygen sats 99%. Pt IV access CDI and infusing. Warm blanket applied. Safety maintained.

## 2024-08-05 NOTE — PROVATION PATIENT INSTRUCTIONS
Discharge Summary/Instructions after an Endoscopic Procedure  Patient Name: Lety Vogel  Patient MRN: [id number]  Patient YOB: 1965 Monday, August 5, 2024  Jackson Dominguez MD  Dear patient,  As a result of recent federal legislation (The Federal Cures Act), you may   receive lab or pathology results from your procedure in your [facility]   account before your physician is able to contact you. Your physician or   their representative will relay the results to you with their   recommendations at their soonest availability.  Thank you,  RESTRICTIONS:  During your procedure today, you received medications for sedation.  These   medications may affect your judgment, balance and coordination.  Therefore,   for 24 hours, you have the following restrictions:   - DO NOT drive a car, operate machinery, make legal/financial decisions,   sign important papers or drink alcohol.    ACTIVITY:  Today: no heavy lifting, straining or running due to procedural   sedation/anesthesia.  The following day: return to full activity including work.  DIET:  Eat and drink normally unless instructed otherwise.     TREATMENT FOR COMMON SIDE EFFECTS:  - Mild abdominal pain, nausea, belching, bloating or excessive gas:  rest,   eat lightly and use a heating pad.  - Sore Throat: treat with throat lozenges and/or gargle with warm salt   water.  - Because air was used during the procedure, expelling large amounts of air   from your rectum or belching is normal.  - If a bowel prep was taken, you may not have a bowel movement for 1-3 days.    This is normal.  SYMPTOMS TO WATCH FOR AND REPORT TO YOUR PHYSICIAN:  1. Abdominal pain or bloating, other than gas cramps.  2. Chest pain.  3. Back pain.  4. Signs of infection such as: chills or fever occurring within 24 hours   after the procedure.  5. Rectal bleeding, which would show as bright red, maroon, or black stools.   (A tablespoon of blood from the rectum is not serious, especially if    hemorrhoids are present.)  6. Vomiting.  7. Weakness or dizziness.  GO DIRECTLY TO THE NEAREST EMERGENCY ROOM IF YOU HAVE ANY OF THE FOLLOWING:      Difficulty breathing              Chills and/or fever over 101 F   Persistent vomiting and/or vomiting blood   Severe abdominal pain   Severe chest pain   Black, tarry stools   Bleeding- more than one tablespoon   Any other symptom or condition that you feel may need urgent attention  Your doctor recommends these additional instructions:  If any biopsies were taken, your doctors clinic will contact you in 1 to 2   weeks with any results.  - Patient has a contact number available for emergencies.  The signs and   symptoms of potential delayed complications were discussed with the   patient.  Return to normal activities tomorrow.  Written discharge   instructions were provided to the patient.   - Discharge patient to home.   - Resume previous diet.   - Continue present medications.   - Repeat colonoscopy in 5 years for screening purposes.   For questions, problems or results please call your physician - Jackson Dominguez MD at Work:  [phone].  [facility], EMERGENCY ROOM PHONE NUMBER: [phone]  IF A COMPLICATION OR EMERGENCY SITUATION ARISES AND YOU ARE UNABLE TO REACH   YOUR PHYSICIAN - GO DIRECTLY TO THE EMERGENCY ROOM.  Jackson Dominguez MD  8/5/2024 9:56:55 AM  This report has been verified and signed electronically.  Dear patient,  As a result of recent federal legislation (The Federal Cures Act), you may   receive lab or pathology results from your procedure in your [facility]   account before your physician is able to contact you. Your physician or   their representative will relay the results to you with their   recommendations at their soonest availability.  Thank you,  PROVATION

## 2024-08-05 NOTE — H&P
Short Stay Endoscopy History and Physical    PCP - Erick George MD    Procedure - Colonoscopy  Sedation: GA  ASA - per anesthesia  Mallampati - per anesthesia  History of Anesthesia problems - no  Family history Anesthesia problems -  no     HPI:  This is a 58 y.o. female here for evaluation of : Family history of CRC    Reflux - no  Dysphagia - no  Abdominal pain - no  Diarrhea - no    ROS:  Constitutional: No fevers, chills, No weight loss  ENT: No allergies  CV: No chest pain  Pulm: No cough, No shortness of breath  Ophtho: No vision changes  GI: see HPI  Medical History:  has a past medical history of Anxiety, Early menopause, GERD (gastroesophageal reflux disease), and Prediabetes.    Surgical History:  has a past surgical history that includes none; Knee surgery; Open reduction and internal fixation (ORIF) of fracture of distal radius (Left, 01/11/2019); and Colonoscopy (N/A, 06/10/2019).    Family History: family history includes Aneurysm in her father; Arthritis in her mother; Breast cancer (age of onset: 64) in her sister; Colon cancer (age of onset: 55) in her father; Diabetes in her brother; Heart disease in her father; Hypertension in her mother; Ovarian cancer (age of onset: 41) in her cousin; Schizophrenia in her cousin; Stroke in her brother.. Otherwise no colon cancer, inflammatory bowel disease, or GI malignancies.    Social History:  reports that she quit smoking about 18 years ago. Her smoking use included cigarettes. She has never used smokeless tobacco. She reports current alcohol use of about 4.0 standard drinks of alcohol per week. She reports that she does not use drugs.    Review of patient's allergies indicates:   Allergen Reactions    Feathers Hives, Itching and Rash       Medications:   Medications Prior to Admission   Medication Sig Dispense Refill Last Dose    alprazolam (XANAX) 0.5 MG tablet Take 1 tablet (0.5 mg total) by mouth daily as needed for Anxiety. 1 Tablet Oral Every  day 90 tablet 1     ascorbic acid, vitamin C, (VITAMIN C) 500 MG tablet Take 1 tablet (500 mg total) by mouth once daily. 50 tablet 0     calcium-vitamin D3 500 mg(1,250mg) -200 unit per tablet Take 1 tablet by mouth 2 (two) times daily with meals.       estradioL (ESTRACE) 1 MG tablet Take one tablet by mouth once daily 90 tablet 0     glucosamine-chondroitin 500-400 mg tablet Take 1 tablet by mouth 3 (three) times daily.       multivitamin capsule Take 1 capsule by mouth once daily.       pantoprazole (PROTONIX) 20 MG tablet Take one tablet by mouth first thing on an empty stomach in the morning and eat 45 minutes later 90 tablet 0     progesterone (PROMETRIUM) 200 MG capsule Take 1 capsule by mouth every evening 90 capsule 0     SEMAGLUTIDE SUBQ Inject 20 Units into the skin once a week.   7/23/2024       Objective Findings:    Vital Signs: Per nursing notes.    Physical Exam:  General Appearance: Well appearing in no acute distress  Head:   Normocephalic, without obvious abnormality  Eyes:    No scleral icterus  Airway: Open  Neck: No restriction in mobility  Lungs: CTA bilaterally in anterior and posterior fields, no wheezes, no crackles.  Heart:  Regular rate and rhythm, S1, S2 normal, no murmurs heard  Abdomen: Soft, non tender, non distended      Labs:  Lab Results   Component Value Date    WBC 6.12 05/25/2023    HGB 13.2 05/25/2023    HCT 41.4 05/25/2023     05/25/2023    CHOL 211 (H) 05/25/2023    TRIG 139 05/25/2023    HDL 55 05/25/2023    ALT 17 05/25/2023    AST 21 05/25/2023     05/25/2023    K 4.0 05/25/2023     05/25/2023    CREATININE 0.8 05/25/2023    BUN 9 05/25/2023    CO2 25 05/25/2023    TSH 2.288 05/25/2023    HGBA1C 5.5 05/25/2023         I have explained the risks and benefits of endoscopy procedures to the patient including but not limited to bleeding, perforation, infection, and death.    Thank you so much for allowing me to participate in the care of Lety Jeremy  Jaspreet Dominguez MD

## 2024-08-26 ENCOUNTER — HOSPITAL ENCOUNTER (OUTPATIENT)
Dept: RADIOLOGY | Facility: HOSPITAL | Age: 59
Discharge: HOME | End: 2024-08-26
Attending: STUDENT IN AN ORGANIZED HEALTH CARE EDUCATION/TRAINING PROGRAM
Payer: COMMERCIAL

## 2024-08-26 VITALS — WEIGHT: 188 LBS | BODY MASS INDEX: 30.34 KG/M2

## 2024-08-26 DIAGNOSIS — Z12.31 ENCOUNTER FOR SCREENING MAMMOGRAM FOR BREAST CANCER: ICD-10-CM

## 2024-08-26 PROCEDURE — 77067 SCR MAMMO BI INCL CAD: CPT | Mod: 26,,, | Performed by: RADIOLOGY

## 2024-08-26 PROCEDURE — 77067 SCR MAMMO BI INCL CAD: CPT | Mod: TC

## 2024-08-26 PROCEDURE — 77063 BREAST TOMOSYNTHESIS BI: CPT | Mod: 26,,, | Performed by: RADIOLOGY

## 2024-09-03 DIAGNOSIS — Z91.89 AT HIGH RISK FOR BREAST CANCER: Primary | ICD-10-CM

## 2024-09-04 ENCOUNTER — TELEPHONE (OUTPATIENT)
Dept: INTERNAL MEDICINE | Facility: CLINIC | Age: 59
End: 2024-09-04
Payer: COMMERCIAL

## 2024-09-04 NOTE — TELEPHONE ENCOUNTER
Attempted to contact Ms. Jaspreet to inform her per Dr. George     recommend the high risk breast department for consultation appt     But no answer.  LVM to call the office.

## 2024-09-04 NOTE — TELEPHONE ENCOUNTER
----- Message from Erick George MD sent at 9/3/2024  5:43 PM CDT -----  Please call and recommend the high risk breast department for consultation appt.  She may not want to go but I would like to have her reasoning documented please.

## 2024-09-18 PROBLEM — Z91.89 AT RISK FOR IMPAIRED HEALTH MAINTENANCE: Status: ACTIVE | Noted: 2024-09-18

## 2024-10-23 DIAGNOSIS — N95.1 MENOPAUSAL SYMPTOMS: ICD-10-CM

## 2024-10-23 RX ORDER — ESTRADIOL 1 MG/1
1 TABLET ORAL
Qty: 90 TABLET | Refills: 0 | Status: SHIPPED | OUTPATIENT
Start: 2024-10-23

## 2024-10-23 RX ORDER — PROGESTERONE 200 MG/1
200 CAPSULE ORAL NIGHTLY
Qty: 90 CAPSULE | Refills: 0 | Status: SHIPPED | OUTPATIENT
Start: 2024-10-23

## 2024-10-23 NOTE — TELEPHONE ENCOUNTER
Refill Routing Note   Medication(s) are not appropriate for processing by [facility] for the following reason(s):        Patient not seen by provider within 15 months    [facility] action(s):  Defer               Appointments  past 12m or future 3m with PCP    Date Provider   Last Visit   2/13/2023 Frieda Meier MD   Next Visit   Visit date not found Frieda Meier MD   ED visits in past 90 days: 0        Note composed:12:59 PM 10/23/2024

## 2024-10-29 DIAGNOSIS — N95.1 MENOPAUSAL SYMPTOMS: ICD-10-CM

## 2024-10-29 DIAGNOSIS — K21.9 GASTROESOPHAGEAL REFLUX DISEASE WITHOUT ESOPHAGITIS: ICD-10-CM

## 2024-10-29 RX ORDER — PROGESTERONE 200 MG/1
200 CAPSULE ORAL NIGHTLY
Qty: 90 CAPSULE | Refills: 0 | OUTPATIENT
Start: 2024-10-29

## 2024-10-29 RX ORDER — PANTOPRAZOLE SODIUM 20 MG/1
TABLET, DELAYED RELEASE ORAL
Qty: 90 TABLET | Refills: 0 | Status: SHIPPED | OUTPATIENT
Start: 2024-10-29

## 2024-10-29 RX ORDER — ESTRADIOL 1 MG/1
1 TABLET ORAL
Qty: 90 TABLET | Refills: 0 | OUTPATIENT
Start: 2024-10-29

## 2024-10-31 ENCOUNTER — PATIENT MESSAGE (OUTPATIENT)
Dept: OBSTETRICS AND GYNECOLOGY | Facility: CLINIC | Age: 59
End: 2024-10-31
Payer: COMMERCIAL

## 2024-10-31 DIAGNOSIS — N95.1 MENOPAUSAL SYMPTOMS: Primary | ICD-10-CM

## 2024-11-01 RX ORDER — PROGESTERONE 200 MG/1
200 CAPSULE ORAL NIGHTLY
Qty: 30 CAPSULE | Refills: 2 | Status: SHIPPED | OUTPATIENT
Start: 2024-11-01 | End: 2025-11-01

## 2024-11-01 RX ORDER — ESTRADIOL 1 MG/1
1 TABLET ORAL DAILY
Qty: 30 TABLET | Refills: 2 | Status: SHIPPED | OUTPATIENT
Start: 2024-11-01 | End: 2025-11-01

## 2024-12-09 ENCOUNTER — PATIENT MESSAGE (OUTPATIENT)
Dept: ADMINISTRATIVE | Facility: OTHER | Age: 59
End: 2024-12-09
Payer: COMMERCIAL

## 2024-12-09 ENCOUNTER — PATIENT OUTREACH (OUTPATIENT)
Dept: ADMINISTRATIVE | Facility: OTHER | Age: 59
End: 2024-12-09
Payer: COMMERCIAL

## 2024-12-09 NOTE — PROGRESS NOTES
CHW - Outreach Attempt    Community Health Worker left a voicemail message for 1st attempt to contact patient via patient portal regarding: SDOH completion and assessment   Community Health Worker to attempt to contact patient on: 12/9/24

## 2024-12-10 ENCOUNTER — OFFICE VISIT (OUTPATIENT)
Dept: PRIMARY CARE CLINIC | Facility: CLINIC | Age: 59
End: 2024-12-10
Payer: COMMERCIAL

## 2024-12-10 VITALS
HEART RATE: 83 BPM | WEIGHT: 194.44 LBS | DIASTOLIC BLOOD PRESSURE: 67 MMHG | BODY MASS INDEX: 32.4 KG/M2 | OXYGEN SATURATION: 99 % | SYSTOLIC BLOOD PRESSURE: 110 MMHG | HEIGHT: 65 IN | TEMPERATURE: 98 F

## 2024-12-10 DIAGNOSIS — Z11.4 SCREENING FOR HIV (HUMAN IMMUNODEFICIENCY VIRUS): ICD-10-CM

## 2024-12-10 DIAGNOSIS — Z76.0 ENCOUNTER FOR MEDICATION REFILL: ICD-10-CM

## 2024-12-10 DIAGNOSIS — Z80.0 FAMILY HISTORY OF COLON CANCER IN FATHER: ICD-10-CM

## 2024-12-10 DIAGNOSIS — G89.29 CHRONIC PAIN OF RIGHT KNEE: ICD-10-CM

## 2024-12-10 DIAGNOSIS — F41.9 ANXIETY: ICD-10-CM

## 2024-12-10 DIAGNOSIS — Z86.39 HISTORY OF HYPERCHOLESTEROLEMIA: ICD-10-CM

## 2024-12-10 DIAGNOSIS — Z11.59 ENCOUNTER FOR HEPATITIS C SCREENING TEST FOR LOW RISK PATIENT: ICD-10-CM

## 2024-12-10 DIAGNOSIS — Z13.29 SCREENING FOR THYROID DISORDER: ICD-10-CM

## 2024-12-10 DIAGNOSIS — K21.9 GASTROESOPHAGEAL REFLUX DISEASE WITHOUT ESOPHAGITIS: ICD-10-CM

## 2024-12-10 DIAGNOSIS — Z87.898 HISTORY OF PREDIABETES: ICD-10-CM

## 2024-12-10 DIAGNOSIS — Z78.9 HISTORY OF MOTOR VEHICLE TRAFFIC ACCIDENT: ICD-10-CM

## 2024-12-10 DIAGNOSIS — Z00.00 ENCOUNTER FOR WELLNESS EXAMINATION IN ADULT: Primary | ICD-10-CM

## 2024-12-10 DIAGNOSIS — Z80.3 FAMILY HISTORY OF BREAST CANCER: ICD-10-CM

## 2024-12-10 DIAGNOSIS — M25.561 CHRONIC PAIN OF RIGHT KNEE: ICD-10-CM

## 2024-12-10 PROCEDURE — 99396 PREV VISIT EST AGE 40-64: CPT | Mod: S$GLB,,, | Performed by: NURSE PRACTITIONER

## 2024-12-10 PROCEDURE — 99999 PR PBB SHADOW E&M-EST. PATIENT-LVL V: CPT | Mod: PBBFAC,,, | Performed by: NURSE PRACTITIONER

## 2024-12-10 PROCEDURE — 99214 OFFICE O/P EST MOD 30 MIN: CPT | Mod: 25,S$GLB,, | Performed by: NURSE PRACTITIONER

## 2024-12-10 RX ORDER — CLOTRIMAZOLE AND BETAMETHASONE DIPROPIONATE 10; .5 MG/ML; MG/ML
LOTION TOPICAL
COMMUNITY
Start: 2021-10-01

## 2024-12-10 RX ORDER — PANTOPRAZOLE SODIUM 20 MG/1
TABLET, DELAYED RELEASE ORAL
Qty: 90 TABLET | Refills: 0 | Status: SHIPPED | OUTPATIENT
Start: 2024-12-10

## 2024-12-10 NOTE — PATIENT INSTRUCTIONS
Please get your labs done at any [facility] facility that has a laboratory, you do not need an appointment.     I will communicate your laboratory and/or imaging results with you through your [employer] account that was set up through the portal, it was a pleasure to see you again, take care.
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
## 2024-12-12 ENCOUNTER — PATIENT MESSAGE (OUTPATIENT)
Dept: HEMATOLOGY/ONCOLOGY | Facility: CLINIC | Age: 59
End: 2024-12-12
Payer: COMMERCIAL

## 2024-12-17 ENCOUNTER — PATIENT MESSAGE (OUTPATIENT)
Dept: ADMINISTRATIVE | Facility: OTHER | Age: 59
End: 2024-12-17
Payer: COMMERCIAL

## 2025-03-05 RX ORDER — CLOTRIMAZOLE AND BETAMETHASONE DIPROPIONATE 10; .64 MG/G; MG/G
CREAM TOPICAL 2 TIMES DAILY
Qty: 15 G | Refills: 1 | Status: SHIPPED | OUTPATIENT
Start: 2025-03-05

## 2025-04-30 DIAGNOSIS — N95.1 MENOPAUSAL SYMPTOMS: ICD-10-CM

## 2025-04-30 RX ORDER — ESTRADIOL 1 MG/1
1 TABLET ORAL
Qty: 30 TABLET | Refills: 2 | Status: SHIPPED | OUTPATIENT
Start: 2025-04-30

## 2025-05-24 DIAGNOSIS — N95.1 MENOPAUSAL SYMPTOMS: ICD-10-CM

## 2025-05-26 RX ORDER — ESTRADIOL 1 MG/1
1 TABLET ORAL
Qty: 90 TABLET | Refills: 1 | OUTPATIENT
Start: 2025-05-26

## 2025-07-20 DIAGNOSIS — N95.1 MENOPAUSAL SYMPTOMS: ICD-10-CM

## 2025-07-21 ENCOUNTER — TELEPHONE (OUTPATIENT)
Dept: OBSTETRICS AND GYNECOLOGY | Facility: CLINIC | Age: 60
End: 2025-07-21
Payer: COMMERCIAL

## 2025-07-21 RX ORDER — PROGESTERONE 200 MG/1
200 CAPSULE ORAL NIGHTLY
Qty: 90 CAPSULE | Refills: 0 | Status: SHIPPED | OUTPATIENT
Start: 2025-07-21

## 2025-08-03 DIAGNOSIS — N95.1 MENOPAUSAL SYMPTOMS: ICD-10-CM

## 2025-08-04 RX ORDER — ESTRADIOL 1 MG/1
1 TABLET ORAL
Qty: 30 TABLET | Refills: 1 | Status: SHIPPED | OUTPATIENT
Start: 2025-08-04

## 2025-08-22 ENCOUNTER — OFFICE VISIT (OUTPATIENT)
Dept: OBSTETRICS AND GYNECOLOGY | Facility: CLINIC | Age: 60
End: 2025-08-22
Attending: OBSTETRICS & GYNECOLOGY
Payer: COMMERCIAL

## 2025-08-22 VITALS
WEIGHT: 192.81 LBS | HEIGHT: 65 IN | SYSTOLIC BLOOD PRESSURE: 124 MMHG | HEART RATE: 67 BPM | BODY MASS INDEX: 32.12 KG/M2 | DIASTOLIC BLOOD PRESSURE: 74 MMHG

## 2025-08-22 DIAGNOSIS — L30.4 INTERTRIGO: ICD-10-CM

## 2025-08-22 DIAGNOSIS — N95.1 MENOPAUSAL SYMPTOMS: ICD-10-CM

## 2025-08-22 DIAGNOSIS — Z12.31 SCREENING MAMMOGRAM, ENCOUNTER FOR: ICD-10-CM

## 2025-08-22 DIAGNOSIS — Z01.419 ENCOUNTER FOR GYNECOLOGICAL EXAMINATION WITHOUT ABNORMAL FINDING: Primary | ICD-10-CM

## 2025-08-22 DIAGNOSIS — Z12.4 PAP SMEAR FOR CERVICAL CANCER SCREENING: ICD-10-CM

## 2025-08-22 PROCEDURE — 99999 PR PBB SHADOW E&M-EST. PATIENT-LVL III: CPT | Mod: PBBFAC,,, | Performed by: OBSTETRICS & GYNECOLOGY

## 2025-08-22 RX ORDER — CLOTRIMAZOLE AND BETAMETHASONE DIPROPIONATE 10; .64 MG/G; MG/G
CREAM TOPICAL 2 TIMES DAILY
Qty: 15 G | Refills: 1 | Status: SHIPPED | OUTPATIENT
Start: 2025-08-22

## 2025-08-22 RX ORDER — ESTRADIOL 1 MG/1
1 TABLET ORAL DAILY
Qty: 90 TABLET | Refills: 3 | Status: SHIPPED | OUTPATIENT
Start: 2025-08-22

## 2025-08-22 RX ORDER — PROGESTERONE 200 MG/1
200 CAPSULE ORAL NIGHTLY
Qty: 90 CAPSULE | Refills: 3 | Status: SHIPPED | OUTPATIENT
Start: 2025-08-22

## 2025-08-28 ENCOUNTER — HOSPITAL ENCOUNTER (OUTPATIENT)
Dept: RADIOLOGY | Facility: HOSPITAL | Age: 60
Discharge: HOME | End: 2025-08-28
Attending: STUDENT IN AN ORGANIZED HEALTH CARE EDUCATION/TRAINING PROGRAM
Payer: COMMERCIAL

## 2025-08-28 DIAGNOSIS — Z12.31 ENCOUNTER FOR SCREENING MAMMOGRAM FOR BREAST CANCER: ICD-10-CM

## 2025-08-28 PROCEDURE — 77063 BREAST TOMOSYNTHESIS BI: CPT | Mod: TC

## (undated) DEVICE — SYS CLSR DERMABOND PRINEO 22CM

## (undated) DEVICE — DRESSING N ADH OIL EMUL 3X3

## (undated) DEVICE — GUIDEWIRE ORTHO .054 X 6 IN
Type: IMPLANTABLE DEVICE | Site: RADIUS | Status: NON-FUNCTIONAL
Removed: 2019-01-11

## (undated) DEVICE — PAD CAST 2 IN X 4YDS STERILE

## (undated) DEVICE — 2.8 DRILL BIT

## (undated) DEVICE — SUT VICRYL PLUS 4-0 P3 18IN

## (undated) DEVICE — BANDAGE ELASTIC 2X5 VELCRO ST

## (undated) DEVICE — SEE MEDLINE ITEM 157131

## (undated) DEVICE — BIT DRILL QUICK RELEASE 2.0MM

## (undated) DEVICE — NDL N SERIES MICRO-DISSECTION

## (undated) DEVICE — SEE MEDLINE ITEM 152515

## (undated) DEVICE — IMMOBILIZER SHOULDER RAINBOW L

## (undated) DEVICE — GAUZE SPONGE 4X4 12PLY

## (undated) DEVICE — SCREW BONE CORTICAL THRD 2.3X2
Type: IMPLANTABLE DEVICE | Site: RADIUS | Status: NON-FUNCTIONAL
Removed: 2019-01-11

## (undated) DEVICE — DRAPE C-ARM MINI DISP

## (undated) DEVICE — TOURNIQUET SB QC DP 18X4IN

## (undated) DEVICE — CORD BIPOLAR 12 FOOT

## (undated) DEVICE — BANDAGE CONFORM 3IN STRL

## (undated) DEVICE — SPLINT PLASTER FAST SET 5X30IN

## (undated) DEVICE — DRAPE STERI-DRAPE 1000 17X11IN

## (undated) DEVICE — Device

## (undated) DEVICE — SEE MEDLINE ITEM 152622

## (undated) DEVICE — SUT MONOCRYL 4-0 PS-2

## (undated) DEVICE — PAD CAST SPECIALIST STRL 4

## (undated) DEVICE — BANDAGE ELASTIC ACE 2IN 10/CA